# Patient Record
Sex: MALE | Race: WHITE | NOT HISPANIC OR LATINO | URBAN - METROPOLITAN AREA
[De-identification: names, ages, dates, MRNs, and addresses within clinical notes are randomized per-mention and may not be internally consistent; named-entity substitution may affect disease eponyms.]

---

## 2017-01-05 ENCOUNTER — GENERIC CONVERSION - ENCOUNTER (OUTPATIENT)
Dept: OTHER | Facility: OTHER | Age: 57
End: 2017-01-05

## 2017-08-03 ENCOUNTER — ALLSCRIPTS OFFICE VISIT (OUTPATIENT)
Dept: OTHER | Facility: OTHER | Age: 57
End: 2017-08-03

## 2018-01-15 VITALS
HEART RATE: 88 BPM | RESPIRATION RATE: 20 BRPM | DIASTOLIC BLOOD PRESSURE: 80 MMHG | TEMPERATURE: 97 F | BODY MASS INDEX: 28.44 KG/M2 | SYSTOLIC BLOOD PRESSURE: 125 MMHG | OXYGEN SATURATION: 99 % | WEIGHT: 192 LBS | HEIGHT: 69 IN

## 2018-01-16 NOTE — PROGRESS NOTES
Assessment    1  Combined hyperlipidemia (272 2) (E78 2)   2  Encounter for screening colonoscopy (V76 51) (Z12 11)   3  Encounter for preventive health examination (V70 0) (Z00 00)    Plan  Combined hyperlipidemia    · (1) LIPID PANEL, FASTING; Status:Active; Requested [id number];   Encounter for screening colonoscopy    · 1 - Alonso TALBERT, Roseanna Casas (Gastroenterology) Physician Referral  Consult  Status: Active   Requested for: [id number]  Care Summary provided  : Yes  Health Maintenance    · (1) CBC/ PLT (NO DIFF); Status:Active; Requested [id number];    · (1) COMPREHENSIVE METABOLIC PANEL; Status:Active; Requested [id number];     Discussion/Summary  Impression: health maintenance visit  Currently, he eats a healthy diet  Colorectal cancer screening: the risks and benefits of colorectal cancer screening were discussed  Chief Complaint  CPE      History of Present Illness  HM, Adult Male: The patient is being seen for a health maintenance evaluation  The last health maintenance visit was doesn't remember  Social History: Household members include spouse, 1 daughter(s) and 1 son(s)  He is   Work status: working full time  The patient has never smoked cigarettes  He reports rare alcohol use  The patient has no concerns about alcohol abuse  He has never used illicit drugs  General Health: The patient's health since the last visit is described as good  Screening:      Review of Systems    Constitutional: No fever or chills, feels well, no tiredness, no recent weight gain or weight loss  Eyes: No complaints of eye pain, no red eyes, no discharge from eyes, no itchy eyes  ENT: no complaints of earache, no hearing loss, no nosebleeds, no nasal discharge, no sore throat, no hoarseness  Cardiovascular: No complaints of slow heart rate, no fast heart rate, no chest pain, no palpitations, no leg claudication, no lower extremity     Respiratory: No complaints of shortness of breath, no wheezing, no cough, no SOB on exertion, no orthopnea or PND  Musculoskeletal: No complaints of arthralgia, no myalgias, no joint swelling or stiffness, no limb pain or swelling  Integumentary: No complaints of skin rash or skin lesions, no itching, no skin wound, no dry skin  Neurological: No compliants of headache, no confusion, no convulsions, no numbness or tingling, no dizziness or fainting, no limb weakness, no difficulty walking  Psychiatric: Is not suicidal, no sleep disturbances, no anxiety or depression, no change in personality, no emotional problems  Endocrine: No complaints of proptosis, no hot flashes, no muscle weakness, no erectile dysfunction, no deepening of the voice, no feelings of weakness  Hematologic/Lymphatic: No complaints of swollen glands, no swollen glands in the neck, does not bleed easily, no easy bruising  Active Problems    1  Combined hyperlipidemia (272 2) (E78 2)   2  Encounter for screening colonoscopy (V76 51) (Z12 11)    Past Medical History    · History of hyperlipidemia (V12 29) (Z86 39)    Family History  Mother    · Family history of diabetes mellitus (V18 0) (Z83 3)   · Family history of hypertension (V17 49) (Z82 49)  Father    · Family history of diabetes mellitus (V18 0) (Z83 3)   · Family history of hypertension (V17 49) (Z82 49)    Social History    · Never a smoker    Current Meds   1  No Reported Medications Recorded    Allergies    1  No Known Drug Allergies    2  Seasonal    Vitals   Recorded: [id number] 05:28PM   Heart Rate 74    Respiration 20    Blood Pressure 80 mm Hg 140 mm Hg   Height 5 ft 9 in    Weight 191 lb     BMI Calculated 28 21 kg/m2    BSA Calculated 2 03 m2    O2 Saturation 99    Pain Scale 0      Physical Exam    Constitutional   General appearance: No acute distress, well appearing and well nourished  Head and Face   Head and face: Normal     Palpation of the face and sinuses: No sinus tenderness      Eyes   Conjunctiva and lids: No erythema, swelling or discharge  Pupils and irises: Equal, round, reactive to light  Ears, Nose, Mouth, and Throat   Otoscopic examination: Tympanic membranes translucent with normal light reflex  Canals patent without erythema  Hearing: Normal     Nasal mucosa, septum, and turbinates: Normal without edema or erythema  Lips, teeth, and gums: Normal, good dentition  Oropharynx: Normal with no erythema, edema, exudate or lesions  Neck   Neck: Supple, symmetric, trachea midline, no masses  Thyroid: Normal, no thyromegaly  Pulmonary   Respiratory effort: No increased work of breathing or signs of respiratory distress  Auscultation of lungs: Clear to auscultation  Cardiovascular   Auscultation of heart: Normal rate and rhythm, normal S1 and S2, no murmurs  Abdomen   Abdomen: Non-tender, no masses  Liver and spleen: No hepatomegaly or splenomegaly  Examination for hernias: No hernias appreciated  Anus, perineum, and rectum: Normal sphincter tone, no masses, no prolapse  Stool sample for occult blood: Negative  Genitourinary   Scrotal contents: Normal testes, no masses  Penis: Normal, no lesions  Digital rectal exam of prostate: Normal size, no masses  Lymphatic   Palpation of lymph nodes in neck: No lymphadenopathy  Signatures   Electronically signed by :  GEORGE Leslie ; Jul 11 2016 11:48AM EST                       (Author)

## 2019-02-28 ENCOUNTER — OFFICE VISIT (OUTPATIENT)
Dept: FAMILY MEDICINE CLINIC | Facility: CLINIC | Age: 59
End: 2019-02-28
Payer: COMMERCIAL

## 2019-02-28 VITALS
OXYGEN SATURATION: 98 % | RESPIRATION RATE: 16 BRPM | HEART RATE: 80 BPM | SYSTOLIC BLOOD PRESSURE: 168 MMHG | WEIGHT: 210 LBS | DIASTOLIC BLOOD PRESSURE: 84 MMHG | BODY MASS INDEX: 31.01 KG/M2

## 2019-02-28 DIAGNOSIS — N39.43 DRIBBLING OF URINE: ICD-10-CM

## 2019-02-28 DIAGNOSIS — R03.0 ELEVATED BP WITHOUT DIAGNOSIS OF HYPERTENSION: Primary | ICD-10-CM

## 2019-02-28 DIAGNOSIS — M10.9 ACUTE GOUT, UNSPECIFIED CAUSE, UNSPECIFIED SITE: ICD-10-CM

## 2019-02-28 DIAGNOSIS — Z23 ENCOUNTER FOR IMMUNIZATION: ICD-10-CM

## 2019-02-28 PROCEDURE — 99396 PREV VISIT EST AGE 40-64: CPT | Performed by: FAMILY MEDICINE

## 2019-02-28 PROCEDURE — 90471 IMMUNIZATION ADMIN: CPT | Performed by: FAMILY MEDICINE

## 2019-02-28 PROCEDURE — 90682 RIV4 VACC RECOMBINANT DNA IM: CPT | Performed by: FAMILY MEDICINE

## 2019-02-28 RX ORDER — OMEGA-3 FATTY ACIDS/FISH OIL 300-1000MG
600 CAPSULE ORAL 2 TIMES DAILY
COMMUNITY

## 2019-03-04 ENCOUNTER — TELEPHONE (OUTPATIENT)
Dept: FAMILY MEDICINE CLINIC | Facility: CLINIC | Age: 59
End: 2019-03-04

## 2019-03-04 DIAGNOSIS — M10.479 OTHER SECONDARY ACUTE GOUT OF ANKLE, UNSPECIFIED LATERALITY: Primary | ICD-10-CM

## 2019-03-04 RX ORDER — COLCHICINE 0.6 MG/1
TABLET ORAL
Qty: 30 TABLET | Refills: 5 | Status: SHIPPED | OUTPATIENT
Start: 2019-03-04 | End: 2020-08-20 | Stop reason: SDUPTHER

## 2019-03-05 NOTE — PATIENT INSTRUCTIONS
Of no blood pressure is high today he will come back in 2 weeks after his labs are done we will repeat his pressure then

## 2019-03-05 NOTE — PROGRESS NOTES
Assessment/Plan:    No problem-specific Assessment & Plan notes found for this encounter  Diagnoses and all orders for this visit:    Elevated BP without diagnosis of hypertension  -     Comprehensive metabolic panel; Future  -     Lipid panel; Future    Dribbling of urine  -     PSA Total, Diagnostic; Future    Encounter for immunization  -     PREFERRED: influenza vaccine, [id number], quadrivalent, recombinant, PF, 0 5 mL, for patients 18 yr+ (FLUBLOK)    Acute gout, unspecified cause, unspecified site    Other orders  -     Ibuprofen 200 MG CAPS; Take 600 mg by mouth 2 (two) times a day          Subjective:      Patient ID: Tara Ruiz is a 60 y o  male  This is a 50-year-old male who is here for his physical he has a long chronic history of back issues and 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  He has learned to deal with the pain he also had a bout of gout last year but no incidences since he has cut out beer which she has noticed has made a big improvement      The following portions of the patient's history were reviewed and updated as appropriate: current medications, past family history, past medical history, past social history, past surgical history and problem list     Review of Systems   Constitutional: Negative  HENT: Negative  Eyes: Negative  Respiratory: Negative  Cardiovascular: Negative  Gastrointestinal: Negative  Endocrine: Negative  Genitourinary: Negative  Musculoskeletal: Negative  Allergic/Immunologic: Negative  Neurological: Negative  Hematological: Negative  Psychiatric/Behavioral: Negative  Objective:      /84   Pulse 80   Resp 16   Wt 95 3 kg (210 lb)   SpO2 98%   BMI 31 01 kg/m²          Physical Exam   Constitutional: He is oriented to person, place, and time  He appears well-developed and well-nourished  HENT:   Head: Normocephalic and atraumatic     Right Ear: External ear normal    Left Ear: External ear normal    Mouth/Throat: Oropharynx is clear and moist    Eyes: Pupils are equal, round, and reactive to light  Conjunctivae and EOM are normal    Neck: Normal range of motion  Neck supple  Cardiovascular: Normal rate, regular rhythm and normal heart sounds  Pulmonary/Chest: Effort normal and breath sounds normal    Abdominal: Soft  Bowel sounds are normal    Genitourinary: Penis normal    Genitourinary Comments: Testicles no masses   Neurological: He is alert and oriented to person, place, and time  He displays normal reflexes  No cranial nerve deficit  Coordination normal    Skin: Skin is warm and dry  Psychiatric: He has a normal mood and affect   His behavior is normal  Judgment and thought content normal

## 2019-03-12 LAB
ALBUMIN SERPL-MCNC: 4.7 G/DL (ref 3.6–5.1)
ALBUMIN/GLOB SERPL: 1.7 (CALC) (ref 1–2.5)
ALP SERPL-CCNC: 84 U/L (ref 40–115)
ALT SERPL-CCNC: 26 U/L (ref 9–46)
AST SERPL-CCNC: 25 U/L (ref 10–35)
BILIRUB SERPL-MCNC: 0.6 MG/DL (ref 0.2–1.2)
BUN SERPL-MCNC: 12 MG/DL (ref 7–25)
BUN/CREAT SERPL: ABNORMAL (CALC) (ref 6–22)
CALCIUM SERPL-MCNC: 9.7 MG/DL (ref 8.6–10.3)
CHLORIDE SERPL-SCNC: 99 MMOL/L (ref 98–110)
CHOLEST SERPL-MCNC: 212 MG/DL
CHOLEST/HDLC SERPL: 5 (CALC)
CO2 SERPL-SCNC: 29 MMOL/L (ref 20–32)
CREAT SERPL-MCNC: 0.92 MG/DL (ref 0.7–1.33)
GLOBULIN SER CALC-MCNC: 2.8 G/DL (CALC) (ref 1.9–3.7)
GLUCOSE SERPL-MCNC: 158 MG/DL (ref 65–99)
HDLC SERPL-MCNC: 42 MG/DL
LDLC SERPL CALC-MCNC: 142 MG/DL (CALC)
NONHDLC SERPL-MCNC: 170 MG/DL (CALC)
POTASSIUM SERPL-SCNC: 4.1 MMOL/L (ref 3.5–5.3)
PROT SERPL-MCNC: 7.5 G/DL (ref 6.1–8.1)
PSA SERPL-MCNC: 0.4 NG/ML
SL AMB EGFR AFRICAN AMERICAN: 105 ML/MIN/1.73M2
SL AMB EGFR NON AFRICAN AMERICAN: 91 ML/MIN/1.73M2
SODIUM SERPL-SCNC: 136 MMOL/L (ref 135–146)
TRIGL SERPL-MCNC: 151 MG/DL

## 2019-04-25 ENCOUNTER — OFFICE VISIT (OUTPATIENT)
Dept: FAMILY MEDICINE CLINIC | Facility: CLINIC | Age: 59
End: 2019-04-25
Payer: COMMERCIAL

## 2019-04-25 VITALS
TEMPERATURE: 97.6 F | OXYGEN SATURATION: 98 % | SYSTOLIC BLOOD PRESSURE: 150 MMHG | WEIGHT: 212 LBS | DIASTOLIC BLOOD PRESSURE: 88 MMHG | RESPIRATION RATE: 18 BRPM | HEART RATE: 83 BPM | BODY MASS INDEX: 31.31 KG/M2

## 2019-04-25 DIAGNOSIS — R07.9 CHEST PAIN, UNSPECIFIED TYPE: ICD-10-CM

## 2019-04-25 DIAGNOSIS — R19.5 POSITIVE COLORECTAL CANCER SCREENING USING COLOGUARD TEST: Primary | ICD-10-CM

## 2019-04-25 DIAGNOSIS — I10 ESSENTIAL HYPERTENSION: ICD-10-CM

## 2019-04-25 DIAGNOSIS — E78.2 MIXED HYPERLIPIDEMIA: ICD-10-CM

## 2019-04-25 PROCEDURE — 93000 ELECTROCARDIOGRAM COMPLETE: CPT | Performed by: FAMILY MEDICINE

## 2019-04-25 PROCEDURE — 99213 OFFICE O/P EST LOW 20 MIN: CPT | Performed by: FAMILY MEDICINE

## 2019-04-25 RX ORDER — LISINOPRIL 10 MG/1
10 TABLET ORAL DAILY
Qty: 60 TABLET | Refills: 0 | Status: SHIPPED | OUTPATIENT
Start: 2019-04-25 | End: 2019-06-24 | Stop reason: SDUPTHER

## 2019-04-25 RX ORDER — ATORVASTATIN CALCIUM 20 MG/1
20 TABLET, FILM COATED ORAL DAILY
Qty: 60 TABLET | Refills: 0 | Status: SHIPPED | OUTPATIENT
Start: 2019-04-25 | End: 2019-05-23 | Stop reason: SDUPTHER

## 2019-04-26 ENCOUNTER — TELEPHONE (OUTPATIENT)
Dept: FAMILY MEDICINE CLINIC | Facility: CLINIC | Age: 59
End: 2019-04-26

## 2019-05-02 NOTE — PROGRESS NOTES
Assessment/Plan:    No problem-specific Assessment & Plan notes found for this encounter  Diagnoses and all orders for this visit:    Positive colorectal cancer screening using Cologuard test  -     Ambulatory referral to Gastroenterology; Future    Essential hypertension  -     lisinopril (ZESTRIL) 10 mg tablet; Take 1 tablet (10 mg total) by mouth daily  -     atorvastatin (LIPITOR) 20 mg tablet; Take 1 tablet (20 mg total) by mouth daily    Chest pain, unspecified type  -     POCT ECG    Mixed hyperlipidemia          Subjective:      Patient ID: Benita Conley is a 60 y o  male  Patient is here to review his labs PSA was fine his cholesterol levels are up so I started him on a low-dose statin at this point he also had positive cold guard so he was referred to GI for colonoscopy   His blood pressure was mildly high again today so I formally diagnosed with hypertension as well  He occasionally gets some chest heaviness not a particular pattern but he has gained back 20 lb so we will check an EKG here to make sure it looks normal    The following portions of the patient's history were reviewed and updated as appropriate: current medications, past family history, past medical history, past social history, past surgical history and problem list     Review of Systems   HENT: Negative  Eyes: Negative  Respiratory: Negative  Cardiovascular: Negative  Objective:      /88 (BP Location: Left arm, Patient Position: Sitting, Cuff Size: Large)   Pulse 83   Temp 97 6 °F (36 4 °C) (Tympanic)   Resp 18   Wt 96 2 kg (212 lb)   SpO2 98%   BMI 31 31 kg/m²          Physical Exam  Assessment/Plan:    No problem-specific Assessment & Plan notes found for this encounter  Diagnoses and all orders for this visit:    Positive colorectal cancer screening using Cologuard test  -     Ambulatory referral to Gastroenterology;  Future    Essential hypertension  -     lisinopril (ZESTRIL) 10 mg tablet; Take 1 tablet (10 mg total) by mouth daily  -     atorvastatin (LIPITOR) 20 mg tablet; Take 1 tablet (20 mg total) by mouth daily    Chest pain, unspecified type  -     POCT ECG    Mixed hyperlipidemia          Subjective:      Patient ID: Kang Anguiano is a 60 y o  male      HPI    The following portions of the patient's history were reviewed and updated as appropriate: current medications, past family history, past medical history, past social history, past surgical history and problem list     Review of Systems      Objective:      /88 (BP Location: Left arm, Patient Position: Sitting, Cuff Size: Large)   Pulse 83   Temp 97 6 °F (36 4 °C) (Tympanic)   Resp 18   Wt 96 2 kg (212 lb)   SpO2 98%   BMI 31 31 kg/m²          Physical Exam    Physical exam was not done because he just had it 2 weeks ago a complete physical exam today was to review labs and talk about medications to be placed on    EKG was within normal limits  Was told that continues or worsens so let us now

## 2019-05-15 ENCOUNTER — OFFICE VISIT (OUTPATIENT)
Dept: GASTROENTEROLOGY | Facility: CLINIC | Age: 59
End: 2019-05-15
Payer: COMMERCIAL

## 2019-05-15 VITALS
SYSTOLIC BLOOD PRESSURE: 144 MMHG | WEIGHT: 203.6 LBS | HEART RATE: 100 BPM | DIASTOLIC BLOOD PRESSURE: 80 MMHG | TEMPERATURE: 99.1 F | RESPIRATION RATE: 18 BRPM | BODY MASS INDEX: 30.16 KG/M2 | HEIGHT: 69 IN

## 2019-05-15 DIAGNOSIS — R19.5 POSITIVE COLORECTAL CANCER SCREENING USING COLOGUARD TEST: Primary | ICD-10-CM

## 2019-05-15 PROCEDURE — 99204 OFFICE O/P NEW MOD 45 MIN: CPT | Performed by: INTERNAL MEDICINE

## 2019-05-20 NOTE — PROGRESS NOTES
Assessment/Plan:    No problem-specific Assessment & Plan notes found for this encounter  {Assess/PlanSmartLinks:94101}      Subjective:      Patient ID: Bianca Miramontes is a 60 y o  male      HPI    {Common ambulatory SmartLinks:12803}    Review of Systems      Objective:      /88 (BP Location: Left arm, Patient Position: Sitting, Cuff Size: Large)   Pulse 83   Temp 97 6 °F (36 4 °C) (Tympanic)   Resp 18   Wt 96 2 kg (212 lb)   SpO2 98%   BMI 31 31 kg/m²          Physical Exam

## 2019-05-23 DIAGNOSIS — I10 ESSENTIAL HYPERTENSION: ICD-10-CM

## 2019-05-23 RX ORDER — ATORVASTATIN CALCIUM 20 MG/1
20 TABLET, FILM COATED ORAL DAILY
Qty: 90 TABLET | Refills: 1 | Status: SHIPPED | OUTPATIENT
Start: 2019-05-23 | End: 2020-05-12

## 2019-05-28 ENCOUNTER — TELEPHONE (OUTPATIENT)
Dept: GASTROENTEROLOGY | Facility: AMBULARY SURGERY CENTER | Age: 59
End: 2019-05-28

## 2019-05-28 DIAGNOSIS — R19.5 POSITIVE COLORECTAL CANCER SCREENING USING COLOGUARD TEST: Primary | ICD-10-CM

## 2019-05-29 ENCOUNTER — TELEPHONE (OUTPATIENT)
Dept: GASTROENTEROLOGY | Facility: AMBULARY SURGERY CENTER | Age: 59
End: 2019-05-29

## 2019-05-29 LAB
BASOPHILS # BLD AUTO: 20 CELLS/UL (ref 0–200)
BASOPHILS NFR BLD AUTO: 0.4 %
EOSINOPHIL # BLD AUTO: 10 CELLS/UL (ref 15–500)
EOSINOPHIL NFR BLD AUTO: 0.2 %
ERYTHROCYTE [DISTWIDTH] IN BLOOD BY AUTOMATED COUNT: 12.8 % (ref 11–15)
HCT VFR BLD AUTO: 39 % (ref 38.5–50)
HGB BLD-MCNC: 13.9 G/DL (ref 13.2–17.1)
LYMPHOCYTES # BLD AUTO: 1765 CELLS/UL (ref 850–3900)
LYMPHOCYTES NFR BLD AUTO: 34.6 %
MCH RBC QN AUTO: 32.5 PG (ref 27–33)
MCHC RBC AUTO-ENTMCNC: 35.6 G/DL (ref 32–36)
MCV RBC AUTO: 91.1 FL (ref 80–100)
MONOCYTES # BLD AUTO: 428 CELLS/UL (ref 200–950)
MONOCYTES NFR BLD AUTO: 8.4 %
NEUTROPHILS # BLD AUTO: 2876 CELLS/UL (ref 1500–7800)
NEUTROPHILS NFR BLD AUTO: 56.4 %
PLATELET # BLD AUTO: 207 THOUSAND/UL (ref 140–400)
PMV BLD REES-ECKER: 10.2 FL (ref 7.5–12.5)
RBC # BLD AUTO: 4.28 MILLION/UL (ref 4.2–5.8)
TSH SERPL-ACNC: 0.69 MIU/L (ref 0.4–4.5)
WBC # BLD AUTO: 5.1 THOUSAND/UL (ref 3.8–10.8)

## 2019-06-10 ENCOUNTER — ANESTHESIA EVENT (OUTPATIENT)
Dept: GASTROENTEROLOGY | Facility: AMBULARY SURGERY CENTER | Age: 59
End: 2019-06-10

## 2019-06-11 ENCOUNTER — HOSPITAL ENCOUNTER (OUTPATIENT)
Dept: GASTROENTEROLOGY | Facility: AMBULARY SURGERY CENTER | Age: 59
Setting detail: OUTPATIENT SURGERY
Discharge: HOME | End: 2019-06-11
Attending: INTERNAL MEDICINE | Admitting: INTERNAL MEDICINE
Payer: COMMERCIAL

## 2019-06-11 ENCOUNTER — ANESTHESIA (OUTPATIENT)
Dept: GASTROENTEROLOGY | Facility: AMBULARY SURGERY CENTER | Age: 59
End: 2019-06-11

## 2019-06-11 VITALS
HEART RATE: 76 BPM | RESPIRATION RATE: 18 BRPM | TEMPERATURE: 97.1 F | SYSTOLIC BLOOD PRESSURE: 150 MMHG | OXYGEN SATURATION: 100 % | DIASTOLIC BLOOD PRESSURE: 98 MMHG

## 2019-06-11 DIAGNOSIS — R19.5 POSITIVE COLORECTAL CANCER SCREENING USING COLOGUARD TEST: ICD-10-CM

## 2019-06-11 PROCEDURE — G0121 COLON CA SCRN NOT HI RSK IND: HCPCS | Performed by: INTERNAL MEDICINE

## 2019-06-11 RX ORDER — PROPOFOL 10 MG/ML
INJECTION, EMULSION INTRAVENOUS CONTINUOUS PRN
Status: DISCONTINUED | OUTPATIENT
Start: 2019-06-11 | End: 2019-06-11 | Stop reason: SURG

## 2019-06-11 RX ORDER — SODIUM CHLORIDE, SODIUM LACTATE, POTASSIUM CHLORIDE, CALCIUM CHLORIDE 600; 310; 30; 20 MG/100ML; MG/100ML; MG/100ML; MG/100ML
125 INJECTION, SOLUTION INTRAVENOUS CONTINUOUS
Status: DISCONTINUED | OUTPATIENT
Start: 2019-06-11 | End: 2019-06-15 | Stop reason: HOSPADM

## 2019-06-11 RX ORDER — HYDRALAZINE HYDROCHLORIDE 20 MG/ML
INJECTION INTRAMUSCULAR; INTRAVENOUS AS NEEDED
Status: DISCONTINUED | OUTPATIENT
Start: 2019-06-11 | End: 2019-06-11 | Stop reason: SURG

## 2019-06-11 RX ORDER — ASPIRIN 81 MG/1
81 TABLET ORAL DAILY
COMMUNITY

## 2019-06-11 RX ORDER — PROPOFOL 10 MG/ML
INJECTION, EMULSION INTRAVENOUS AS NEEDED
Status: DISCONTINUED | OUTPATIENT
Start: 2019-06-11 | End: 2019-06-11 | Stop reason: SURG

## 2019-06-11 RX ORDER — SODIUM CHLORIDE, SODIUM LACTATE, POTASSIUM CHLORIDE, CALCIUM CHLORIDE 600; 310; 30; 20 MG/100ML; MG/100ML; MG/100ML; MG/100ML
INJECTION, SOLUTION INTRAVENOUS CONTINUOUS PRN
Status: DISCONTINUED | OUTPATIENT
Start: 2019-06-11 | End: 2019-06-11 | Stop reason: SURG

## 2019-06-11 RX ORDER — LIDOCAINE HYDROCHLORIDE 10 MG/ML
0.5 INJECTION, SOLUTION EPIDURAL; INFILTRATION; INTRACAUDAL; PERINEURAL ONCE AS NEEDED
Status: DISCONTINUED | OUTPATIENT
Start: 2019-06-11 | End: 2019-06-15 | Stop reason: HOSPADM

## 2019-06-11 RX ADMIN — HYDRALAZINE HYDROCHLORIDE 10 MG: 20 INJECTION INTRAMUSCULAR; INTRAVENOUS at 11:48

## 2019-06-11 RX ADMIN — PROPOFOL 120 MCG/KG/MIN: 10 INJECTION, EMULSION INTRAVENOUS at 11:29

## 2019-06-11 RX ADMIN — SODIUM CHLORIDE, SODIUM LACTATE, POTASSIUM CHLORIDE, AND CALCIUM CHLORIDE 125 ML/HR: .6; .31; .03; .02 INJECTION, SOLUTION INTRAVENOUS at 10:58

## 2019-06-11 RX ADMIN — SODIUM CHLORIDE, SODIUM LACTATE, POTASSIUM CHLORIDE, AND CALCIUM CHLORIDE: .6; .31; .03; .02 INJECTION, SOLUTION INTRAVENOUS at 11:08

## 2019-06-11 RX ADMIN — PROPOFOL 130 MG: 10 INJECTION, EMULSION INTRAVENOUS at 11:26

## 2019-06-24 DIAGNOSIS — I10 ESSENTIAL HYPERTENSION: ICD-10-CM

## 2019-06-24 RX ORDER — LISINOPRIL 10 MG/1
TABLET ORAL
Qty: 60 TABLET | Refills: 0 | Status: SHIPPED | OUTPATIENT
Start: 2019-06-24 | End: 2019-08-22 | Stop reason: SDUPTHER

## 2019-06-27 DIAGNOSIS — I10 ESSENTIAL HYPERTENSION: ICD-10-CM

## 2019-06-27 RX ORDER — LISINOPRIL 10 MG/1
10 TABLET ORAL DAILY
Qty: 60 TABLET | Refills: 0 | Status: CANCELLED | OUTPATIENT
Start: 2019-06-27

## 2019-08-22 DIAGNOSIS — I10 ESSENTIAL HYPERTENSION: ICD-10-CM

## 2019-08-22 RX ORDER — LISINOPRIL 10 MG/1
TABLET ORAL
Qty: 30 TABLET | Refills: 1 | Status: SHIPPED | OUTPATIENT
Start: 2019-08-22 | End: 2019-09-28 | Stop reason: SDUPTHER

## 2019-08-29 DIAGNOSIS — I10 ESSENTIAL HYPERTENSION: ICD-10-CM

## 2019-08-29 RX ORDER — ATORVASTATIN CALCIUM 20 MG/1
20 TABLET, FILM COATED ORAL DAILY
Qty: 90 TABLET | Refills: 1 | Status: CANCELLED | OUTPATIENT
Start: 2019-08-29

## 2019-09-13 ENCOUNTER — OFFICE VISIT (OUTPATIENT)
Dept: FAMILY MEDICINE CLINIC | Facility: CLINIC | Age: 59
End: 2019-09-13
Payer: COMMERCIAL

## 2019-09-13 VITALS
SYSTOLIC BLOOD PRESSURE: 130 MMHG | DIASTOLIC BLOOD PRESSURE: 86 MMHG | BODY MASS INDEX: 31.1 KG/M2 | HEIGHT: 69 IN | OXYGEN SATURATION: 99 % | WEIGHT: 210 LBS | HEART RATE: 76 BPM

## 2019-09-13 DIAGNOSIS — I10 ESSENTIAL HYPERTENSION: Primary | ICD-10-CM

## 2019-09-13 DIAGNOSIS — E78.2 MIXED HYPERLIPIDEMIA: ICD-10-CM

## 2019-09-13 DIAGNOSIS — Z23 INFLUENZA VACCINATION ADMINISTERED AT CURRENT VISIT: ICD-10-CM

## 2019-09-13 PROCEDURE — 3008F BODY MASS INDEX DOCD: CPT | Performed by: FAMILY MEDICINE

## 2019-09-13 PROCEDURE — 3075F SYST BP GE 130 - 139MM HG: CPT | Performed by: FAMILY MEDICINE

## 2019-09-13 PROCEDURE — 90471 IMMUNIZATION ADMIN: CPT | Performed by: FAMILY MEDICINE

## 2019-09-13 PROCEDURE — 3079F DIAST BP 80-89 MM HG: CPT | Performed by: FAMILY MEDICINE

## 2019-09-13 PROCEDURE — 90682 RIV4 VACC RECOMBINANT DNA IM: CPT | Performed by: FAMILY MEDICINE

## 2019-09-13 PROCEDURE — 99213 OFFICE O/P EST LOW 20 MIN: CPT | Performed by: FAMILY MEDICINE

## 2019-09-16 NOTE — PROGRESS NOTES
Assessment/Plan:    No problem-specific Assessment & Plan notes found for this encounter  Diagnoses and all orders for this visit:    Essential hypertension  -     Lipid Panel with Direct LDL reflex; Future  -     Comprehensive metabolic panel; Future    Influenza vaccination administered at current visit  -     influenza vaccine, [id number], quadrivalent, recombinant, PF, 0 5 mL, for patients 18 yr+ (FLUBLOK)    Mixed hyperlipidemia          Subjective:      Patient ID: Nakia Mueller is a 60 y o  male  Dayves Navaom is here for 3 month check he needs to have his cholesterol repeated has we had started him on any statin he is not having any trouble at this point feeling well his positive cold guard prompted a colonoscopy which was fine    Blood pressure under much better control  The following portions of the patient's history were reviewed and updated as appropriate: current medications, past family history, past medical history, past social history, past surgical history and problem list     Review of Systems   Constitutional: Negative  HENT: Negative  Eyes: Negative  Respiratory: Negative  Cardiovascular: Negative  Objective:      /86   Pulse 76   Ht 5' 9" (1 753 m)   Wt 95 3 kg (210 lb)   SpO2 99%   BMI 31 01 kg/m²          Physical Exam   Constitutional: He appears well-developed and well-nourished  HENT:   Head: Normocephalic and atraumatic  Cardiovascular: Normal rate, regular rhythm and normal heart sounds     Pulmonary/Chest: Effort normal and breath sounds normal

## 2019-09-28 DIAGNOSIS — I10 ESSENTIAL HYPERTENSION: ICD-10-CM

## 2019-10-14 RX ORDER — LISINOPRIL 10 MG/1
TABLET ORAL
Qty: 30 TABLET | Refills: 1 | Status: SHIPPED | OUTPATIENT
Start: 2019-10-14 | End: 2020-10-25 | Stop reason: SDUPTHER

## 2019-11-01 DIAGNOSIS — I10 ESSENTIAL HYPERTENSION: ICD-10-CM

## 2019-11-01 RX ORDER — LISINOPRIL 10 MG/1
TABLET ORAL
Qty: 30 TABLET | Refills: 1 | Status: SHIPPED | OUTPATIENT
Start: 2019-11-01 | End: 2019-12-01 | Stop reason: SDUPTHER

## 2019-11-07 DIAGNOSIS — I10 ESSENTIAL HYPERTENSION: ICD-10-CM

## 2019-11-07 RX ORDER — LISINOPRIL 10 MG/1
10 TABLET ORAL DAILY
Qty: 90 TABLET | Refills: 2 | Status: CANCELLED | OUTPATIENT
Start: 2019-11-07

## 2019-12-01 DIAGNOSIS — I10 ESSENTIAL HYPERTENSION: ICD-10-CM

## 2019-12-27 RX ORDER — LISINOPRIL 10 MG/1
TABLET ORAL
Qty: 30 TABLET | Refills: 1 | Status: SHIPPED | OUTPATIENT
Start: 2019-12-27 | End: 2020-08-20 | Stop reason: SDUPTHER

## 2020-01-03 DIAGNOSIS — I10 ESSENTIAL HYPERTENSION: ICD-10-CM

## 2020-01-03 RX ORDER — LISINOPRIL 10 MG/1
TABLET ORAL
Qty: 90 TABLET | Refills: 3 | Status: SHIPPED | OUTPATIENT
Start: 2020-01-03 | End: 2020-08-20 | Stop reason: SDUPTHER

## 2020-01-08 DIAGNOSIS — I10 ESSENTIAL HYPERTENSION: ICD-10-CM

## 2020-01-08 RX ORDER — LISINOPRIL 10 MG/1
10 TABLET ORAL DAILY
Qty: 30 TABLET | Refills: 1 | Status: CANCELLED | OUTPATIENT
Start: 2020-01-08

## 2020-05-11 DIAGNOSIS — I10 ESSENTIAL HYPERTENSION: ICD-10-CM

## 2020-05-12 RX ORDER — ATORVASTATIN CALCIUM 20 MG/1
TABLET, FILM COATED ORAL
Qty: 90 TABLET | Refills: 1 | Status: SHIPPED | OUTPATIENT
Start: 2020-05-12 | End: 2020-12-14 | Stop reason: SDUPTHER

## 2020-08-20 ENCOUNTER — OFFICE VISIT (OUTPATIENT)
Dept: FAMILY MEDICINE CLINIC | Facility: CLINIC | Age: 60
End: 2020-08-20
Payer: COMMERCIAL

## 2020-08-20 VITALS
OXYGEN SATURATION: 98 % | DIASTOLIC BLOOD PRESSURE: 80 MMHG | HEIGHT: 69 IN | HEART RATE: 98 BPM | WEIGHT: 198.6 LBS | BODY MASS INDEX: 29.41 KG/M2 | TEMPERATURE: 97.6 F | RESPIRATION RATE: 16 BRPM | SYSTOLIC BLOOD PRESSURE: 112 MMHG

## 2020-08-20 DIAGNOSIS — R73.09 ELEVATED GLUCOSE LEVEL: ICD-10-CM

## 2020-08-20 DIAGNOSIS — Z12.5 SCREENING FOR PROSTATE CANCER: ICD-10-CM

## 2020-08-20 DIAGNOSIS — E78.2 MIXED HYPERLIPIDEMIA: ICD-10-CM

## 2020-08-20 DIAGNOSIS — M10.9 GOUT, UNSPECIFIED CAUSE, UNSPECIFIED CHRONICITY, UNSPECIFIED SITE: ICD-10-CM

## 2020-08-20 DIAGNOSIS — Z00.00 ANNUAL PHYSICAL EXAM: Primary | ICD-10-CM

## 2020-08-20 DIAGNOSIS — M10.479 OTHER SECONDARY ACUTE GOUT OF ANKLE, UNSPECIFIED LATERALITY: ICD-10-CM

## 2020-08-20 PROCEDURE — 99396 PREV VISIT EST AGE 40-64: CPT | Performed by: FAMILY MEDICINE

## 2020-08-20 PROCEDURE — 3074F SYST BP LT 130 MM HG: CPT | Performed by: FAMILY MEDICINE

## 2020-08-20 PROCEDURE — 3079F DIAST BP 80-89 MM HG: CPT | Performed by: FAMILY MEDICINE

## 2020-08-20 PROCEDURE — 1036F TOBACCO NON-USER: CPT | Performed by: FAMILY MEDICINE

## 2020-08-20 PROCEDURE — 3008F BODY MASS INDEX DOCD: CPT | Performed by: FAMILY MEDICINE

## 2020-08-20 RX ORDER — COLCHICINE 0.6 MG/1
TABLET ORAL
Qty: 30 TABLET | Refills: 5 | Status: SHIPPED | OUTPATIENT
Start: 2020-08-20 | End: 2021-09-09 | Stop reason: SDUPTHER

## 2020-08-20 NOTE — PROGRESS NOTES
Subjective:           Problem List Items Addressed This Visit        Other    Annual physical exam - Primary    Gout stable cont medications Labs    Mixed hyperlipidemia low fat diet      Other Visit Diagnoses     Elevated glucose level     Low carb    Screening for prostate cancer                  No orders of the defined types were placed in this encounter  Patient Instructions   Stay current with colon, prostate screening Labs Low fat diet    BMI Counseling: Body mass index is 29 67 kg/m²  Discussed the patient's BMI with him  The BMI is above normal  Nutrition recommendations include 3-5 servings of fruits/vegetables daily, consuming healthier snacks and decreasing soda and/or juice intake  Javier Mealing    Chief Complaint   Patient presents with    Physical Exam     physical,doing well,no new food or drug allergies  had back surgery 06/18/2020  annual physical HTN gout back surgery  HPI s/p lumpectomy fusion  L4 2 months ago [place]    /80 (BP Location: Left arm, Patient Position: Sitting, Cuff Size: Standard)   Pulse 98   Temp 97 6 °F (36 4 °C) (Tympanic)   Resp 16   Ht 5' 8 6" (1 742 m)   Wt 90 1 kg (198 lb 9 6 oz)   SpO2 98%   BMI 29 67 kg/m²       Allergies   Allergen Reactions    Other      seasonal       Current Outpatient Medications on File Prior to Visit   Medication Sig Dispense Refill    atorvastatin (LIPITOR) 20 mg tablet TAKE 1 TABLET BY MOUTH DAILY  90 tablet 1    lisinopril (ZESTRIL) 10 mg tablet TAKE 1 TABLET BY MOUTH EVERY DAY 30 tablet 1    aspirin (ECOTRIN LOW STRENGTH) 81 mg EC tablet Take 81 mg by mouth daily      colchicine (COLCRYS) 0 6 mg tablet For acute attack take 1 2 mg at 1st sign of the flare then 0 6 mg 1 hour later not to exceed 1 8 in 1 hour  period (Patient not taking: Reported on 8/20/2020) 30 tablet 5    Ibuprofen 200 MG CAPS Take 600 mg by mouth 2 (two) times a day      Na Sulfate-K Sulfate-Mg Sulf (SUPREP BOWEL PREP KIT) 17 5-3 13-1 6 GM/177ML SOLN Take 2 Bottles by mouth see administration instructions Suprep bowel prep  Please follow the instructions from the office (Patient not taking: Reported on 8/20/2020) 2 Bottle 0    [DISCONTINUED] lisinopril (ZESTRIL) 10 mg tablet TAKE 1 TABLET BY MOUTH EVERY DAY 30 tablet 1    [DISCONTINUED] lisinopril (ZESTRIL) 10 mg tablet TAKE 1 TABLET BY MOUTH EVERY DAY 90 tablet 3     No current facility-administered medications on file prior to visit  Past Medical History:   Diagnosis Date    Hypertension        Past Surgical History:   Procedure Laterality Date    ANKLE FRACTURE SURGERY Right     orif    ANKLE HARDWARE REMOVAL Right     BACK SURGERY      laminectomy l 5    BICEPS TENDON REPAIR Right           reports that he has never smoked  He has never used smokeless tobacco  He reports current alcohol use  He reports that he does not use drugs  reports that he has never smoked  He has never used smokeless tobacco         Review of Systems   Constitutional: Negative for appetite change, diaphoresis and fever  HENT: Negative for congestion, ear pain, postnasal drip, sinus pressure, tinnitus and voice change  Eyes: Negative for discharge and itching  Respiratory: Negative for cough, chest tightness, shortness of breath and stridor  Cardiovascular: Negative for chest pain and palpitations  Gastrointestinal: Negative for abdominal distention, blood in stool, diarrhea and vomiting  Endocrine: Negative for cold intolerance  Genitourinary: Negative for flank pain, genital sores and testicular pain  Musculoskeletal: Negative for arthralgias, joint swelling and myalgias  Skin: Negative for rash  Neurological: Negative for tremors, speech difficulty and headaches  Hematological: Negative for adenopathy  Psychiatric/Behavioral: Negative for behavioral problems, dysphoric mood, hallucinations and suicidal ideas         Physical Exam  Vitals signs and nursing note reviewed  Constitutional:       Appearance: He is well-developed  HENT:      Head: Normocephalic and atraumatic  Right Ear: External ear normal       Left Ear: External ear normal       Mouth/Throat:      Pharynx: No oropharyngeal exudate  Eyes:      General: No scleral icterus  Right eye: No discharge  Left eye: No discharge  Conjunctiva/sclera: Conjunctivae normal       Pupils: Pupils are equal, round, and reactive to light  Neck:      Musculoskeletal: Normal range of motion and neck supple  Trachea: No tracheal deviation  Cardiovascular:      Rate and Rhythm: Normal rate and regular rhythm  Heart sounds: No murmur  No friction rub  Pulmonary:      Effort: Pulmonary effort is normal  No respiratory distress  Breath sounds: No stridor  No wheezing or rales  Abdominal:      General: Bowel sounds are normal  There is no distension  Palpations: Abdomen is soft  Tenderness: There is no guarding or rebound  Musculoskeletal:         General: No deformity  Comments: L shoulder AC crep limited ROM   Lymphadenopathy:      Cervical: No cervical adenopathy  Skin:     General: Skin is warm and dry  Capillary Refill: Capillary refill takes less than 2 seconds  Findings: No rash  Comments: Healed scar low lumbar tattoos    Neurological:      General: No focal deficit present  Mental Status: He is alert and oriented to person, place, and time  Cranial Nerves: No cranial nerve deficit  Motor: No abnormal muscle tone  Coordination: Coordination normal    Psychiatric:         Behavior: Behavior normal          Thought Content:  Thought content normal          Judgment: Judgment normal

## 2020-08-26 LAB
ALBUMIN SERPL-MCNC: 4.4 G/DL (ref 3.6–5.1)
ALBUMIN/GLOB SERPL: 1.7 (CALC) (ref 1–2.5)
ALP SERPL-CCNC: 99 U/L (ref 35–144)
ALT SERPL-CCNC: 20 U/L (ref 9–46)
AST SERPL-CCNC: 15 U/L (ref 10–35)
BILIRUB SERPL-MCNC: 0.7 MG/DL (ref 0.2–1.2)
BUN SERPL-MCNC: 11 MG/DL (ref 7–25)
BUN/CREAT SERPL: ABNORMAL (CALC) (ref 6–22)
CALCIUM SERPL-MCNC: 9.2 MG/DL (ref 8.6–10.3)
CHLORIDE SERPL-SCNC: 98 MMOL/L (ref 98–110)
CHOLEST SERPL-MCNC: 158 MG/DL
CHOLEST/HDLC SERPL: 4.3 (CALC)
CO2 SERPL-SCNC: 25 MMOL/L (ref 20–32)
CREAT SERPL-MCNC: 0.95 MG/DL (ref 0.7–1.25)
GLOBULIN SER CALC-MCNC: 2.6 G/DL (CALC) (ref 1.9–3.7)
GLUCOSE SERPL-MCNC: 264 MG/DL (ref 65–99)
HBA1C MFR BLD: 10.4 % OF TOTAL HGB
HDLC SERPL-MCNC: 37 MG/DL
LDLC SERPL CALC-MCNC: 89 MG/DL (CALC)
NONHDLC SERPL-MCNC: 121 MG/DL (CALC)
POTASSIUM SERPL-SCNC: 4.1 MMOL/L (ref 3.5–5.3)
PROT SERPL-MCNC: 7 G/DL (ref 6.1–8.1)
PSA SERPL-MCNC: 0.3 NG/ML
SL AMB EGFR AFRICAN AMERICAN: 100 ML/MIN/1.73M2
SL AMB EGFR NON AFRICAN AMERICAN: 87 ML/MIN/1.73M2
SODIUM SERPL-SCNC: 133 MMOL/L (ref 135–146)
TRIGL SERPL-MCNC: 233 MG/DL

## 2020-09-01 ENCOUNTER — TELEPHONE (OUTPATIENT)
Dept: FAMILY MEDICINE CLINIC | Facility: CLINIC | Age: 60
End: 2020-09-01

## 2020-09-08 ENCOUNTER — OFFICE VISIT (OUTPATIENT)
Dept: FAMILY MEDICINE CLINIC | Facility: CLINIC | Age: 60
End: 2020-09-08
Payer: COMMERCIAL

## 2020-09-08 VITALS
SYSTOLIC BLOOD PRESSURE: 116 MMHG | WEIGHT: 200.9 LBS | DIASTOLIC BLOOD PRESSURE: 76 MMHG | HEIGHT: 68 IN | OXYGEN SATURATION: 98 % | TEMPERATURE: 96.9 F | HEART RATE: 92 BPM | BODY MASS INDEX: 30.45 KG/M2

## 2020-09-08 DIAGNOSIS — E11.65 TYPE 2 DIABETES MELLITUS WITH HYPERGLYCEMIA, WITHOUT LONG-TERM CURRENT USE OF INSULIN (HCC): Primary | ICD-10-CM

## 2020-09-08 PROCEDURE — 99213 OFFICE O/P EST LOW 20 MIN: CPT | Performed by: FAMILY MEDICINE

## 2020-09-08 RX ORDER — GLYBURIDE 5 MG/1
5 TABLET ORAL 2 TIMES DAILY WITH MEALS
Qty: 180 TABLET | Refills: 1 | Status: SHIPPED | OUTPATIENT
Start: 2020-09-08 | End: 2020-09-25 | Stop reason: ALTCHOICE

## 2020-09-08 NOTE — PROGRESS NOTES
Assessment/Plan:      Diagnoses and all orders for this visit:    Type 2 diabetes mellitus with hyperglycemia, without long-term current use of insulin (HCC)  -     glyBURIDE (DIABETA) 5 mg tablet; Take 1 tablet (5 mg total) by mouth 2 (two) times a day with meals  -     metFORMIN (GLUCOPHAGE) 1000 MG tablet; Take 1 tablet (1,000 mg total) by mouth 2 (two) times a day with meals  -     Empagliflozin 10 MG TABS; Take 1 tablet (10 mg total) by mouth every morning        Patient diagnosed with Type 2 DM based on A1c levels  Patient counseled on importance of making dietary changes and addition of exercise  Patient reports returning to work soon and hopes that will allow him to fix his diet and allow for more physical activity  Will consider adding medication if triglycerides remain elevated on next blood work  Follow up in 1 month  Subjective:  Patient is here to discuss bloodwork drawn on August 25th, 2020  Hgb A1c 10 4  Triglycerides 233  Patient's last physical exam was on August 20th, 2020  Patient ID: Nani Mace is a 60 y o  male  Review of Systems   Constitutional: Negative for chills, fatigue and fever  HENT: Negative for congestion, ear pain, hearing loss, rhinorrhea, sore throat, tinnitus and trouble swallowing  Eyes: Negative for pain and visual disturbance  Respiratory: Negative for cough and shortness of breath  Cardiovascular: Negative for chest pain and palpitations  Gastrointestinal: Negative for abdominal pain, blood in stool, constipation, diarrhea, nausea and vomiting  Endocrine: Negative for cold intolerance and heat intolerance  Genitourinary: Negative for difficulty urinating, dysuria and hematuria  Musculoskeletal: Negative for arthralgias, back pain and myalgias  Skin: Negative for rash  Neurological: Negative for dizziness, weakness, light-headedness and headaches  Hematological: Does not bruise/bleed easily     Psychiatric/Behavioral: Negative for confusion  The patient is not nervous/anxious  Objective:     Physical Exam  Constitutional:       Appearance: Normal appearance  HENT:      Head: Normocephalic and atraumatic  Mouth/Throat:      Mouth: Mucous membranes are moist       Pharynx: Oropharynx is clear  Eyes:      Conjunctiva/sclera: Conjunctivae normal       Pupils: Pupils are equal, round, and reactive to light  Cardiovascular:      Rate and Rhythm: Normal rate and regular rhythm  Pulses: Normal pulses  Heart sounds: Normal heart sounds  Pulmonary:      Effort: Pulmonary effort is normal       Breath sounds: Normal breath sounds  Abdominal:      General: Abdomen is flat  Bowel sounds are normal       Tenderness: There is no abdominal tenderness  Musculoskeletal:      Right lower leg: No edema  Left lower leg: No edema  Skin:     General: Skin is warm and dry  Neurological:      General: No focal deficit present  Mental Status: He is alert and oriented to person, place, and time  Psychiatric:         Mood and Affect: Mood normal          Thought Content:  Thought content normal

## 2020-09-25 ENCOUNTER — TELEMEDICINE (OUTPATIENT)
Dept: FAMILY MEDICINE CLINIC | Facility: CLINIC | Age: 60
End: 2020-09-25
Payer: COMMERCIAL

## 2020-09-25 DIAGNOSIS — E11.65 TYPE 2 DIABETES MELLITUS WITH HYPERGLYCEMIA, WITHOUT LONG-TERM CURRENT USE OF INSULIN (HCC): Primary | ICD-10-CM

## 2020-09-25 PROCEDURE — 99213 OFFICE O/P EST LOW 20 MIN: CPT | Performed by: FAMILY MEDICINE

## 2020-09-25 NOTE — PROGRESS NOTES
Virtual Brief Visit    Assessment/Plan:    Type II Diabetes Mellitus with new hypoglycemic events since starting medication  Rolf Sanchez is a pleasant 60year old male with past medical history of essential hypertension and hyperlipidemia who reports to me that he was diagnosed with and started on treatment for Type II diabetes at the end of August  Even if he was recently diagnosed with diabetes in the last month he likely has had it for years is my suspicion  Nevertheless at his last office visit he was started on triple oral anti hyperglycemic therapy with an SGLT-2 inhibitor Empagliflozin 10 mg PO qam, biguanides  Metformin 1000 mg PO bid, as well as Sulfonylurea glyburide 5 mg PO bid  Since then he has been complaining of hypoglycemic like symptoms most prevalent in the evening including shaking, irritability, diaphoresis, focus impairment and feelings of light-headedness that require him to drink a glass of orange juice or eat some candy  He reports since starting this regimen and being more aware of his food intake he is feeling these symptoms  He will check his blood sugar around these times and it will be in the 80's  Although ADA guidelines recommend fasting sugars be between  and 2 hour post prandial less than 180 in diabetics I feel that given his recent diagnosis of DM and triple therapy he may be experiencing hypoglycemic events especially since he is on sulfonylurea which is notorious for this   Although triple oral therapy is not inappropriate in a diabetic with an A1c of 10 4 (which was patients reading on 8/25/2020) given that he is making numerous strides in his diet as well as the fact that he may be sensitive to these combinations of medications I feel it is appropriate to discontinue his glyburide at this time continue the other two medications and refer the patient to diabetes education so he better understands his disease as well as proper carbohydrate intake so he better utilizes his bodies natural way of regulating sugar and insulin with the hopes that he can be controlled with less medication overtime  He is to take a fasting sugar in the morning and alternate between afternoon and 2 hour post prandial sugar checks to determine how sugar control is optimized with dual anti oral hyperglycemic regimens  He will follow up in office in October  Reason for visit is   Chief Complaint   Patient presents with    Virtual Brief Visit        Encounter provider Galilea Hatch MD    Provider located at [address] [place] [id number]    Recent Visits  No visits were found meeting these conditions  Showing recent visits within past 7 days and meeting all other requirements     Today's Visits  Date Type Provider Dept   09/25/20 Telemedicine Renetta Rubinstein, MD [facility]   Showing today's visits and meeting all other requirements     Future Appointments  No visits were found meeting these conditions  Showing future appointments within next 150 days and meeting all other requirements        After connecting through telephone, the patient was identified by name and date of birth  Janet Samson was informed that this is a telemedicine visit and that the visit is being conducted through telephone  My office door was closed  No one else was in the room  He acknowledged consent and understanding of privacy and security of the platform  The patient has agreed to participate and understands he can discontinue the visit at any time  Patient is aware this is a billable service  Subjective    Janet Samson is a 60 y o  male        HPI     60year old male with past medical history of essential hypertension and hyperlipidemia and more recent diagnosis of type 2 diabetes via hemoglobin a1c at end of august complains of hypoglycemic symptoms including shaking, irritability, diaphoresis, focus impairment and feelings of light-headedness that require him to drink a glass of orange juice or eat some candy  He feels better shortly thereafter  Had A1c of 10 4 on 8/25/2020  Was placed on triple anti oral hyperglycemic therapy for this (see above)  He's also more cognizant of what he puts in his body and adamant about losing weight as well  Has never been to diabetes education  Past Medical History:   Diagnosis Date    Hypertension        Past Surgical History:   Procedure Laterality Date    ANKLE FRACTURE SURGERY Right     orif    ANKLE HARDWARE REMOVAL Right     BACK SURGERY      laminectomy l 5    BICEPS TENDON REPAIR Right        Current Outpatient Medications   Medication Sig Dispense Refill    aspirin (ECOTRIN LOW STRENGTH) 81 mg EC tablet Take 81 mg by mouth daily      atorvastatin (LIPITOR) 20 mg tablet TAKE 1 TABLET BY MOUTH DAILY  90 tablet 1    colchicine (COLCRYS) 0 6 mg tablet For acute attack take 1 2 mg at 1st sign of the flare then 0 6 mg 1 hour later not to exceed 1 8 in 1 hour  period 30 tablet 5    Empagliflozin 10 MG TABS Take 1 tablet (10 mg total) by mouth every morning 90 tablet 1    Ibuprofen 200 MG CAPS Take 600 mg by mouth 2 (two) times a day      lisinopril (ZESTRIL) 10 mg tablet TAKE 1 TABLET BY MOUTH EVERY DAY 30 tablet 1    metFORMIN (GLUCOPHAGE) 1000 MG tablet Take 1 tablet (1,000 mg total) by mouth 2 (two) times a day with meals 180 tablet 1    Na Sulfate-K Sulfate-Mg Sulf (SUPREP BOWEL PREP KIT) 17 5-3 13-1 6 GM/177ML SOLN Take 2 Bottles by mouth see administration instructions Suprep bowel prep  Please follow the instructions from the office (Patient not taking: Reported on 9/8/2020) 2 Bottle 0     No current facility-administered medications for this visit  Allergies   Allergen Reactions    Other      seasonal       Review of Systems Per HPI    There were no vitals filed for this visit        I spent 25 minutes directly with the patient during this visit    VIRTUAL VISIT DISCLAIMER    Monisha Schaffer acknowledges that he has consented to an online visit or consultation  He understands that the online visit is based solely on information provided by him, and that, in the absence of a face-to-face physical evaluation by the physician, the diagnosis he receives is both limited and provisional in terms of accuracy and completeness  This is not intended to replace a full medical face-to-face evaluation by the physician  Corneliusarnold Sarbjit understands and accepts these terms

## 2020-10-08 ENCOUNTER — OFFICE VISIT (OUTPATIENT)
Dept: FAMILY MEDICINE CLINIC | Facility: CLINIC | Age: 60
End: 2020-10-08
Payer: COMMERCIAL

## 2020-10-08 VITALS
HEIGHT: 68 IN | OXYGEN SATURATION: 98 % | RESPIRATION RATE: 18 BRPM | WEIGHT: 201.1 LBS | SYSTOLIC BLOOD PRESSURE: 112 MMHG | TEMPERATURE: 97.7 F | DIASTOLIC BLOOD PRESSURE: 70 MMHG | HEART RATE: 80 BPM | BODY MASS INDEX: 30.48 KG/M2

## 2020-10-08 DIAGNOSIS — E11.69 TYPE 2 DIABETES MELLITUS WITH OTHER SPECIFIED COMPLICATION, WITHOUT LONG-TERM CURRENT USE OF INSULIN (HCC): Primary | ICD-10-CM

## 2020-10-08 DIAGNOSIS — Z23 ENCOUNTER FOR IMMUNIZATION: ICD-10-CM

## 2020-10-08 PROBLEM — E11.9 DIABETES MELLITUS (HCC): Status: ACTIVE | Noted: 2020-10-08

## 2020-10-08 PROCEDURE — 1036F TOBACCO NON-USER: CPT

## 2020-10-08 PROCEDURE — 90471 IMMUNIZATION ADMIN: CPT

## 2020-10-08 PROCEDURE — 99213 OFFICE O/P EST LOW 20 MIN: CPT

## 2020-10-08 PROCEDURE — 90682 RIV4 VACC RECOMBINANT DNA IM: CPT

## 2020-10-08 PROCEDURE — 3078F DIAST BP <80 MM HG: CPT

## 2020-10-23 DIAGNOSIS — I10 ESSENTIAL HYPERTENSION: ICD-10-CM

## 2020-10-25 PROCEDURE — 4010F ACE/ARB THERAPY RXD/TAKEN: CPT

## 2020-10-25 RX ORDER — LISINOPRIL 10 MG/1
10 TABLET ORAL DAILY
Qty: 90 TABLET | Refills: 2 | Status: SHIPPED | OUTPATIENT
Start: 2020-10-25 | End: 2021-07-14 | Stop reason: SDUPTHER

## 2020-10-31 DIAGNOSIS — I10 ESSENTIAL HYPERTENSION: ICD-10-CM

## 2020-12-14 DIAGNOSIS — I10 ESSENTIAL HYPERTENSION: ICD-10-CM

## 2020-12-14 RX ORDER — ATORVASTATIN CALCIUM 20 MG/1
20 TABLET, FILM COATED ORAL DAILY
Qty: 90 TABLET | Refills: 3 | Status: SHIPPED | OUTPATIENT
Start: 2020-12-14 | End: 2020-12-24 | Stop reason: SDUPTHER

## 2020-12-23 RX ORDER — ATORVASTATIN CALCIUM 20 MG/1
TABLET, FILM COATED ORAL
Qty: 90 TABLET | Refills: 1 | OUTPATIENT
Start: 2020-12-23

## 2020-12-24 DIAGNOSIS — I10 ESSENTIAL HYPERTENSION: ICD-10-CM

## 2020-12-24 RX ORDER — ATORVASTATIN CALCIUM 20 MG/1
20 TABLET, FILM COATED ORAL DAILY
Qty: 90 TABLET | Refills: 3 | Status: SHIPPED | OUTPATIENT
Start: 2020-12-24 | End: 2021-12-27

## 2021-01-11 PROBLEM — E11.69 COMBINED HYPERLIPIDEMIA ASSOCIATED WITH TYPE 2 DIABETES MELLITUS (HCC): Status: ACTIVE | Noted: 2021-01-11

## 2021-01-11 PROBLEM — E78.2 COMBINED HYPERLIPIDEMIA ASSOCIATED WITH TYPE 2 DIABETES MELLITUS (HCC): Status: ACTIVE | Noted: 2021-01-11

## 2021-01-18 ENCOUNTER — OFFICE VISIT (OUTPATIENT)
Dept: FAMILY MEDICINE CLINIC | Facility: CLINIC | Age: 61
End: 2021-01-18
Payer: COMMERCIAL

## 2021-01-18 VITALS
HEART RATE: 100 BPM | HEIGHT: 68 IN | WEIGHT: 192 LBS | TEMPERATURE: 98.7 F | RESPIRATION RATE: 16 BRPM | SYSTOLIC BLOOD PRESSURE: 120 MMHG | DIASTOLIC BLOOD PRESSURE: 80 MMHG | BODY MASS INDEX: 29.1 KG/M2 | OXYGEN SATURATION: 97 %

## 2021-01-18 DIAGNOSIS — E11.65 TYPE 2 DIABETES MELLITUS WITH HYPERGLYCEMIA, WITHOUT LONG-TERM CURRENT USE OF INSULIN (HCC): ICD-10-CM

## 2021-01-18 DIAGNOSIS — E78.2 COMBINED HYPERLIPIDEMIA ASSOCIATED WITH TYPE 2 DIABETES MELLITUS (HCC): ICD-10-CM

## 2021-01-18 DIAGNOSIS — E78.2 MIXED HYPERLIPIDEMIA: ICD-10-CM

## 2021-01-18 DIAGNOSIS — E11.69 TYPE 2 DIABETES MELLITUS WITH OTHER SPECIFIED COMPLICATION, WITHOUT LONG-TERM CURRENT USE OF INSULIN (HCC): Primary | ICD-10-CM

## 2021-01-18 DIAGNOSIS — E11.69 COMBINED HYPERLIPIDEMIA ASSOCIATED WITH TYPE 2 DIABETES MELLITUS (HCC): ICD-10-CM

## 2021-01-18 LAB — SL AMB POCT HEMOGLOBIN AIC: 5.8 (ref ?–6.5)

## 2021-01-18 PROCEDURE — 3079F DIAST BP 80-89 MM HG: CPT | Performed by: FAMILY MEDICINE

## 2021-01-18 PROCEDURE — 3074F SYST BP LT 130 MM HG: CPT | Performed by: FAMILY MEDICINE

## 2021-01-18 PROCEDURE — 3044F HG A1C LEVEL LT 7.0%: CPT | Performed by: FAMILY MEDICINE

## 2021-01-18 PROCEDURE — 99214 OFFICE O/P EST MOD 30 MIN: CPT | Performed by: FAMILY MEDICINE

## 2021-01-18 PROCEDURE — 3008F BODY MASS INDEX DOCD: CPT | Performed by: FAMILY MEDICINE

## 2021-01-18 PROCEDURE — 1036F TOBACCO NON-USER: CPT | Performed by: FAMILY MEDICINE

## 2021-01-18 PROCEDURE — 83036 HEMOGLOBIN GLYCOSYLATED A1C: CPT | Performed by: FAMILY MEDICINE

## 2021-01-18 NOTE — PATIENT INSTRUCTIONS
ADA diet weight loss 10% six-month A1c goal less than 7 5 labs as ordered stay come with routine health maintenance screening

## 2021-01-18 NOTE — PROGRESS NOTES
Subjective:           Problem List Items Addressed This Visit        Endocrine     continue diet med adjustment    Relevant Orders    POCT hemoglobin A1c (Completed)    Hemoglobin A1C    6 0    Combined hyperlipidemia associated with type 2 diabetes mellitus (HCC) low fat diet cont medications    Relevant Orders    Comprehensive metabolic panel    Lipid panel       Other    Mixed hyperlipidemia low fat diet  Relevant Orders    Comprehensive metabolic panel    Lipid panel              Orders Placed This Encounter   Procedures    Comprehensive metabolic panel     This is a patient instruction: Patient fasting for 8 hours or longer recommended  Standing Status:   Future     Standing Expiration Date:   1/18/2022    Lipid panel     This is a patient instruction: This test requires patient fasting for 10-12 hours or longer  Drinking of black coffee or black tea is acceptable  Standing Status:   Future     Standing Expiration Date:   1/18/2022    Hemoglobin A1C     Standing Status:   Future     Standing Expiration Date:   1/18/2022    POCT hemoglobin A1c       Patient Instructions   ADA diet weight loss 10% six-month A1c goal less than 7 5 labs as ordered stay come with routine health maintenance screening    BMI Counseling: Body mass index is 29 19 kg/m²  Discussed the patient's BMI with him  The BMI is above normal  Nutrition recommendations include moderation in carbohydrate intake and increasing intake of lean protein  Yi Bell    Chief Complaint   Patient presents with    Follow-up     f/u visit on DM,no new food or drug allergies  HPI Ekta Samaniego is in for evaluation follow-up diabetes currently on medications has history of gout hyperlipidemia last A1c 10 4  He is on 2 medication therapy pending follow-up labs   [facility]    /80 (BP Location: Left arm, Patient Position: Sitting, Cuff Size: Standard)   Pulse 100   Temp 98 7 °F (37 1 °C) (Tympanic)   Resp 16 Ht 5' 8" (1 727 m)   Wt 87 1 kg (192 lb)   SpO2 97%   BMI 29 19 kg/m²       Allergies   Allergen Reactions    Other      seasonal       Current Outpatient Medications on File Prior to Visit   Medication Sig Dispense Refill    aspirin (ECOTRIN LOW STRENGTH) 81 mg EC tablet Take 81 mg by mouth daily      atorvastatin (LIPITOR) 20 mg tablet Take 1 tablet (20 mg total) by mouth daily 90 tablet 3    Empagliflozin 10 MG TABS Take 1 tablet (10 mg total) by mouth every morning 90 tablet 1    lisinopril (ZESTRIL) 10 mg tablet Take 1 tablet (10 mg total) by mouth daily 90 tablet 2    [DISCONTINUED] metFORMIN (GLUCOPHAGE) 1000 MG tablet Take 1 tablet (1,000 mg total) by mouth 2 (two) times a day with meals 180 tablet 1    colchicine (COLCRYS) 0 6 mg tablet For acute attack take 1 2 mg at 1st sign of the flare then 0 6 mg 1 hour later not to exceed 1 8 in 1 hour  period (Patient not taking: Reported on 10/8/2020) 30 tablet 5    Ibuprofen 200 MG CAPS Take 600 mg by mouth 2 (two) times a day      Na Sulfate-K Sulfate-Mg Sulf (SUPREP BOWEL PREP KIT) 17 5-3 13-1 6 GM/177ML SOLN Take 2 Bottles by mouth see administration instructions Suprep bowel prep  Please follow the instructions from the office (Patient not taking: Reported on 9/8/2020) 2 Bottle 0     No current facility-administered medications on file prior to visit  Past Medical History:   Diagnosis Date    Hypertension        Past Surgical History:   Procedure Laterality Date    ANKLE FRACTURE SURGERY Right     orif    ANKLE HARDWARE REMOVAL Right     BACK SURGERY      laminectomy l 5    BICEPS TENDON REPAIR Right           reports that he has never smoked  He has never used smokeless tobacco  He reports current alcohol use  He reports that he does not use drugs  reports that he has never smoked  He has never used smokeless tobacco         Review of Systems   Constitutional: Negative for appetite change, diaphoresis and fever     HENT: Negative for congestion, ear pain, postnasal drip, sinus pressure, tinnitus and voice change  Eyes: Negative for discharge and itching  Respiratory: Negative for cough, chest tightness, shortness of breath and stridor  Cardiovascular: Negative for chest pain and palpitations  Gastrointestinal: Negative for abdominal distention, blood in stool, diarrhea and vomiting  Endocrine: Negative for cold intolerance  Genitourinary: Negative for flank pain, genital sores and testicular pain  Musculoskeletal: Positive for back pain  Negative for arthralgias, joint swelling and myalgias  Post op   Skin: Negative for rash  Neurological: Negative for tremors, speech difficulty and headaches  Hematological: Negative for adenopathy  Psychiatric/Behavioral: Negative for behavioral problems, dysphoric mood, hallucinations and suicidal ideas  Physical Exam  Vitals signs and nursing note reviewed  Constitutional:       Appearance: He is well-developed  HENT:      Head: Normocephalic and atraumatic  Right Ear: External ear normal       Left Ear: External ear normal       Mouth/Throat:      Pharynx: No oropharyngeal exudate  Eyes:      General: No scleral icterus  Right eye: No discharge  Left eye: No discharge  Conjunctiva/sclera: Conjunctivae normal       Pupils: Pupils are equal, round, and reactive to light  Neck:      Musculoskeletal: Normal range of motion and neck supple  Trachea: No tracheal deviation  Cardiovascular:      Rate and Rhythm: Normal rate and regular rhythm  Pulses: no weak pulses          Dorsalis pedis pulses are 2+ on the right side and 2+ on the left side  Heart sounds: No murmur  No friction rub  Pulmonary:      Effort: Pulmonary effort is normal  No respiratory distress  Breath sounds: No stridor  No wheezing or rales  Abdominal:      General: Bowel sounds are normal  There is no distension  Palpations: Abdomen is soft  Tenderness: There is no guarding or rebound  Musculoskeletal:         General: No deformity  Comments: L shoulder AC crep limited ROM   Feet:      Right foot:      Skin integrity: No ulcer, skin breakdown, erythema, warmth, callus or dry skin  Left foot:      Skin integrity: No ulcer, skin breakdown, erythema, warmth, callus or dry skin  Lymphadenopathy:      Cervical: No cervical adenopathy  Skin:     General: Skin is warm and dry  Capillary Refill: Capillary refill takes less than 2 seconds  Findings: No rash  Comments: Healed scar low lumbar tattoos    Neurological:      General: No focal deficit present  Mental Status: He is alert and oriented to person, place, and time  Mental status is at baseline  Cranial Nerves: No cranial nerve deficit  Motor: No weakness or abnormal muscle tone  Coordination: Coordination normal       Gait: Gait normal    Psychiatric:         Mood and Affect: Mood normal          Behavior: Behavior normal          Thought Content: Thought content normal          Judgment: Judgment normal        Patient's shoes and socks removed  Right Foot/Ankle   Right Foot Inspection  Skin Exam: skin normal and skin intact no dry skin, no warmth, no callus, no erythema, no maceration, no abnormal color, no pre-ulcer, no ulcer and no callus                          Toe Exam: ROM and strength within normal limits  Sensory       Monofilament testing: diminished  Vascular  Capillary refills: < 3 seconds  The right DP pulse is 2+  Left Foot/Ankle  Left Foot Inspection  Skin Exam: skin normal and skin intactno dry skin, no warmth, no erythema, no maceration, normal color, no pre-ulcer, no ulcer and no callus                         Toe Exam: ROM and strength within normal limits                   Sensory       Monofilament: diminished  Vascular  Capillary refills: < 3 seconds  The left DP pulse is 2+  Assign Risk Category:  No deformity present;  Loss of protective sensation;  No weak pulses       Risk: 1

## 2021-02-16 DIAGNOSIS — E11.65 TYPE 2 DIABETES MELLITUS WITH HYPERGLYCEMIA, WITHOUT LONG-TERM CURRENT USE OF INSULIN (HCC): ICD-10-CM

## 2021-02-21 DIAGNOSIS — E11.65 TYPE 2 DIABETES MELLITUS WITH HYPERGLYCEMIA, WITHOUT LONG-TERM CURRENT USE OF INSULIN (HCC): ICD-10-CM

## 2021-02-23 RX ORDER — EMPAGLIFLOZIN 10 MG/1
TABLET, FILM COATED ORAL
Qty: 90 TABLET | Refills: 1 | Status: SHIPPED | OUTPATIENT
Start: 2021-02-23 | End: 2021-08-23

## 2021-03-10 DIAGNOSIS — Z23 ENCOUNTER FOR IMMUNIZATION: ICD-10-CM

## 2021-03-17 ENCOUNTER — IMMUNIZATIONS (OUTPATIENT)
Dept: FAMILY MEDICINE CLINIC | Facility: HOSPITAL | Age: 61
End: 2021-03-17

## 2021-03-17 DIAGNOSIS — Z23 ENCOUNTER FOR IMMUNIZATION: Primary | ICD-10-CM

## 2021-03-17 PROCEDURE — 0011A SARS-COV-2 / COVID-19 MRNA VACCINE (MODERNA) 100 MCG: CPT

## 2021-03-17 PROCEDURE — 91301 SARS-COV-2 / COVID-19 MRNA VACCINE (MODERNA) 100 MCG: CPT

## 2021-04-15 ENCOUNTER — IMMUNIZATIONS (OUTPATIENT)
Dept: FAMILY MEDICINE CLINIC | Facility: HOSPITAL | Age: 61
End: 2021-04-15

## 2021-04-15 DIAGNOSIS — Z23 ENCOUNTER FOR IMMUNIZATION: Primary | ICD-10-CM

## 2021-04-15 PROCEDURE — 91301 SARS-COV-2 / COVID-19 MRNA VACCINE (MODERNA) 100 MCG: CPT

## 2021-04-15 PROCEDURE — 0012A SARS-COV-2 / COVID-19 MRNA VACCINE (MODERNA) 100 MCG: CPT

## 2021-04-27 DIAGNOSIS — E11.65 TYPE 2 DIABETES MELLITUS WITH HYPERGLYCEMIA, WITHOUT LONG-TERM CURRENT USE OF INSULIN (HCC): ICD-10-CM

## 2021-05-01 ENCOUNTER — NURSE TRIAGE (OUTPATIENT)
Dept: OTHER | Facility: OTHER | Age: 61
End: 2021-05-01

## 2021-05-01 DIAGNOSIS — E11.65 TYPE 2 DIABETES MELLITUS WITH HYPERGLYCEMIA, WITHOUT LONG-TERM CURRENT USE OF INSULIN (HCC): ICD-10-CM

## 2021-05-01 NOTE — TELEPHONE ENCOUNTER
Reason for Disposition   [1] Prescription not at pharmacy AND [2] was prescribed by PCP recently    Answer Assessment - Initial Assessment Questions  1  NAME of MEDICATION: "What medicine are you calling about?"      metformin  2  QUESTION: "What is your question?"      "Somehow the rx never got to the pharmacy "    Reviewed rx  Rx was set as "no print"  Re-sent to pharmacy as "normal"  Verified confirmation receipt of rx to pharmacy      Protocols used: MEDICATION QUESTION CALL-ADULT-

## 2021-05-01 NOTE — TELEPHONE ENCOUNTER
Regarding: medication refill   ----- Message from Cale Lubin sent at 5/1/2021 12:53 PM EDT -----  "  My Metformin 1000mg, never went through to pharmacy now I am completley out "

## 2021-07-14 DIAGNOSIS — I10 ESSENTIAL HYPERTENSION: ICD-10-CM

## 2021-07-14 RX ORDER — LISINOPRIL 10 MG/1
TABLET ORAL
Qty: 90 TABLET | Refills: 2 | Status: SHIPPED | OUTPATIENT
Start: 2021-07-14 | End: 2022-03-31 | Stop reason: SDUPTHER

## 2021-08-22 DIAGNOSIS — E11.65 TYPE 2 DIABETES MELLITUS WITH HYPERGLYCEMIA, WITHOUT LONG-TERM CURRENT USE OF INSULIN (HCC): ICD-10-CM

## 2021-08-23 RX ORDER — EMPAGLIFLOZIN 10 MG/1
TABLET, FILM COATED ORAL
Qty: 90 TABLET | Refills: 1 | Status: SHIPPED | OUTPATIENT
Start: 2021-08-23 | End: 2021-11-26 | Stop reason: ALTCHOICE

## 2021-08-23 NOTE — TELEPHONE ENCOUNTER
Patient needs diabetes follow-up as he has not been seen in the office since September of 2020  Will refill medication for now  Thank you

## 2021-09-03 ENCOUNTER — RA CDI HCC (OUTPATIENT)
Dept: OTHER | Facility: HOSPITAL | Age: 61
End: 2021-09-03

## 2021-09-03 NOTE — PROGRESS NOTES
[facility]  coding opportunities       Chart reviewed, no opportunity found: CHART REVIEWED, NO OPPORTUNITY FOUND                        Patients insurance company: [employer] (Medicare Advantage and Commercial)

## 2021-09-08 NOTE — PROGRESS NOTES
Subjective:           Problem List Items Addressed This Visit        Endocrine    Combined hyperlipidemia associated with type 2 diabetes mellitus ([address] )       Other    Annual physical exam - Primary    Gout    Relevant Medications    colchicine (COLCRYS) 0 6 mg tablet              No orders of the defined types were placed in this encounter  Patient Instructions   Stay current with screenings labs and vaccinations    BMI Counseling: Body mass index is 26 91 kg/m²  Discussed the patient's BMI with him  The BMI is above normal  Nutrition recommendations include reducing fast food intake, increasing intake of lean protein and reducing intake of cholesterol  Luis M Barillas    Chief Complaint   Patient presents with    Physical Exam     HPI  PE DM Hyperlipidemia gout    /64   Pulse 70   Temp 97 7 °F (36 5 °C)   Resp 18   Ht 5' 8" (1 727 m)   Wt 80 3 kg (177 lb)   SpO2 98%   BMI 26 91 kg/m²       Allergies   Allergen Reactions    Other      seasonal       Current Outpatient Medications on File Prior to Visit   Medication Sig Dispense Refill    aspirin (ECOTRIN LOW STRENGTH) 81 mg EC tablet Take 81 mg by mouth daily      atorvastatin (LIPITOR) 20 mg tablet Take 1 tablet (20 mg total) by mouth daily 90 tablet 3    HYDROcodone-acetaminophen (NORCO) 5-325 mg per tablet Take 1 tablet by mouth every 4 to 6 hours as needed for pain      Ibuprofen 200 MG CAPS Take 600 mg by mouth 2 (two) times a day      Jardiance 10 MG TABS TAKE 1 TABLET BY MOUTH EVERY DAY IN THE MORNING 90 tablet 1    lisinopril (ZESTRIL) 10 mg tablet TAKE 1 TABLET BY MOUTH EVERY DAY 90 tablet 2    metFORMIN (GLUCOPHAGE) 1000 MG tablet Take 1 tablet (1,000 mg total) by mouth daily with breakfast 180 tablet 0    Na Sulfate-K Sulfate-Mg Sulf (SUPREP BOWEL PREP KIT) 17 5-3 13-1 6 GM/177ML SOLN Take 2 Bottles by mouth see administration instructions Suprep bowel prep    Please follow the instructions from the office (Patient not taking: Reported on 9/8/2020) 2 Bottle 0    [DISCONTINUED] colchicine (COLCRYS) 0 6 mg tablet For acute attack take 1 2 mg at 1st sign of the flare then 0 6 mg 1 hour later not to exceed 1 8 in 1 hour  period (Patient not taking: Reported on 10/8/2020) 30 tablet 5    [DISCONTINUED] lisinopril (ZESTRIL) 10 mg tablet Take 1 tablet (10 mg total) by mouth daily 90 tablet 2     No current facility-administered medications on file prior to visit  Past Medical History:   Diagnosis Date    Hypertension        Past Surgical History:   Procedure Laterality Date    ANKLE FRACTURE SURGERY Right     orif    ANKLE HARDWARE REMOVAL Right     BACK SURGERY      laminectomy l 5    BICEPS TENDON REPAIR Right           reports that he has never smoked  He has never used smokeless tobacco  He reports current alcohol use  He reports that he does not use drugs  reports that he has never smoked  He has never used smokeless tobacco         Review of Systems   Constitutional: Negative for appetite change, diaphoresis and fever  HENT: Negative for congestion, ear pain, postnasal drip, sinus pressure, tinnitus and voice change  Eyes: Negative for discharge and itching  Respiratory: Negative for cough, chest tightness, shortness of breath and stridor  Cardiovascular: Negative for chest pain and palpitations  Gastrointestinal: Negative for abdominal distention, blood in stool, diarrhea and vomiting  Endocrine: Negative for cold intolerance  Genitourinary: Negative for flank pain, genital sores and testicular pain  Musculoskeletal: Positive for arthralgias and back pain  Negative for joint swelling, myalgias and neck pain  Shoulder R >L   Skin: Negative for rash  Neurological: Negative for tremors, speech difficulty and headaches  Hematological: Negative for adenopathy  Psychiatric/Behavioral: Negative for behavioral problems, dysphoric mood, hallucinations and suicidal ideas         Physical Exam  Vitals and nursing note reviewed  Constitutional:       Appearance: He is well-developed  HENT:      Head: Normocephalic and atraumatic  Right Ear: External ear normal       Left Ear: External ear normal       Mouth/Throat:      Pharynx: No oropharyngeal exudate  Eyes:      General: No scleral icterus  Right eye: No discharge  Left eye: No discharge  Conjunctiva/sclera: Conjunctivae normal       Pupils: Pupils are equal, round, and reactive to light  Neck:      Trachea: No tracheal deviation  Cardiovascular:      Rate and Rhythm: Normal rate and regular rhythm  Heart sounds: No murmur heard  No friction rub  Pulmonary:      Effort: Pulmonary effort is normal  No respiratory distress  Breath sounds: No stridor  No wheezing or rales  Abdominal:      General: Bowel sounds are normal  There is no distension  Palpations: Abdomen is soft  Tenderness: There is no guarding or rebound  Musculoskeletal:         General: No swelling or deformity  Cervical back: Normal range of motion and neck supple  Right lower leg: No edema  Comments: L shoulder AC crep limited ROM   Lymphadenopathy:      Cervical: No cervical adenopathy  Skin:     General: Skin is warm and dry  Capillary Refill: Capillary refill takes less than 2 seconds  Findings: No rash  Comments: Healed scar low lumbar tattoos    Neurological:      General: No focal deficit present  Mental Status: He is alert and oriented to person, place, and time  Cranial Nerves: No cranial nerve deficit  Motor: No abnormal muscle tone  Coordination: Coordination normal    Psychiatric:         Behavior: Behavior normal          Thought Content:  Thought content normal          Judgment: Judgment normal

## 2021-09-09 ENCOUNTER — OFFICE VISIT (OUTPATIENT)
Dept: FAMILY MEDICINE CLINIC | Facility: CLINIC | Age: 61
End: 2021-09-09
Payer: COMMERCIAL

## 2021-09-09 VITALS
HEART RATE: 70 BPM | WEIGHT: 177 LBS | RESPIRATION RATE: 18 BRPM | SYSTOLIC BLOOD PRESSURE: 104 MMHG | HEIGHT: 68 IN | OXYGEN SATURATION: 98 % | BODY MASS INDEX: 26.83 KG/M2 | DIASTOLIC BLOOD PRESSURE: 64 MMHG | TEMPERATURE: 97.7 F

## 2021-09-09 DIAGNOSIS — M10.479 OTHER SECONDARY ACUTE GOUT OF ANKLE, UNSPECIFIED LATERALITY: ICD-10-CM

## 2021-09-09 DIAGNOSIS — E11.69 COMBINED HYPERLIPIDEMIA ASSOCIATED WITH TYPE 2 DIABETES MELLITUS (HCC): ICD-10-CM

## 2021-09-09 DIAGNOSIS — E78.2 COMBINED HYPERLIPIDEMIA ASSOCIATED WITH TYPE 2 DIABETES MELLITUS (HCC): ICD-10-CM

## 2021-09-09 DIAGNOSIS — Z00.00 ANNUAL PHYSICAL EXAM: Primary | ICD-10-CM

## 2021-09-09 PROCEDURE — 3008F BODY MASS INDEX DOCD: CPT | Performed by: FAMILY MEDICINE

## 2021-09-09 PROCEDURE — 3725F SCREEN DEPRESSION PERFORMED: CPT | Performed by: FAMILY MEDICINE

## 2021-09-09 PROCEDURE — 99396 PREV VISIT EST AGE 40-64: CPT | Performed by: FAMILY MEDICINE

## 2021-09-09 PROCEDURE — 1036F TOBACCO NON-USER: CPT | Performed by: FAMILY MEDICINE

## 2021-09-09 RX ORDER — COLCHICINE 0.6 MG/1
TABLET ORAL
Qty: 30 TABLET | Refills: 5 | Status: SHIPPED | OUTPATIENT
Start: 2021-09-09 | End: 2022-07-26

## 2021-09-09 RX ORDER — HYDROCODONE BITARTRATE AND ACETAMINOPHEN 5; 325 MG/1; MG/1
1 TABLET ORAL
COMMUNITY
Start: 2021-07-26

## 2021-09-19 DIAGNOSIS — E11.65 TYPE 2 DIABETES MELLITUS WITH HYPERGLYCEMIA, WITHOUT LONG-TERM CURRENT USE OF INSULIN (HCC): ICD-10-CM

## 2021-09-30 ENCOUNTER — TELEPHONE (OUTPATIENT)
Dept: FAMILY MEDICINE CLINIC | Facility: CLINIC | Age: 61
End: 2021-09-30

## 2021-09-30 DIAGNOSIS — Z12.5 SCREENING FOR PROSTATE CANCER: Primary | ICD-10-CM

## 2021-09-30 LAB
ALBUMIN SERPL-MCNC: 4.5 G/DL (ref 3.6–5.1)
ALBUMIN/GLOB SERPL: 1.9 (CALC) (ref 1–2.5)
ALP SERPL-CCNC: 69 U/L (ref 35–144)
ALT SERPL-CCNC: 17 U/L (ref 9–46)
AST SERPL-CCNC: 16 U/L (ref 10–35)
BILIRUB SERPL-MCNC: 0.8 MG/DL (ref 0.2–1.2)
BUN SERPL-MCNC: 17 MG/DL (ref 7–25)
BUN/CREAT SERPL: ABNORMAL (CALC) (ref 6–22)
CALCIUM SERPL-MCNC: 9.7 MG/DL (ref 8.6–10.3)
CHLORIDE SERPL-SCNC: 100 MMOL/L (ref 98–110)
CHOLEST SERPL-MCNC: 127 MG/DL
CHOLEST/HDLC SERPL: 2.7 (CALC)
CO2 SERPL-SCNC: 32 MMOL/L (ref 20–32)
CREAT SERPL-MCNC: 0.9 MG/DL (ref 0.7–1.25)
GLOBULIN SER CALC-MCNC: 2.4 G/DL (CALC) (ref 1.9–3.7)
GLUCOSE SERPL-MCNC: 104 MG/DL (ref 65–99)
HBA1C MFR BLD: 5.6 % OF TOTAL HGB
HDLC SERPL-MCNC: 47 MG/DL
LDLC SERPL CALC-MCNC: 63 MG/DL (CALC)
NONHDLC SERPL-MCNC: 80 MG/DL (CALC)
POTASSIUM SERPL-SCNC: 4.3 MMOL/L (ref 3.5–5.3)
PROT SERPL-MCNC: 6.9 G/DL (ref 6.1–8.1)
SL AMB EGFR AFRICAN AMERICAN: 106 ML/MIN/1.73M2
SL AMB EGFR NON AFRICAN AMERICAN: 92 ML/MIN/1.73M2
SODIUM SERPL-SCNC: 138 MMOL/L (ref 135–146)
TRIGL SERPL-MCNC: 83 MG/DL

## 2021-09-30 PROCEDURE — 3044F HG A1C LEVEL LT 7.0%: CPT | Performed by: FAMILY MEDICINE

## 2021-09-30 NOTE — TELEPHONE ENCOUNTER
Patient had his blood drawn this morning and would like to know if you can add a PSA to the blood work  He has a family history     Please advse

## 2021-11-26 ENCOUNTER — OFFICE VISIT (OUTPATIENT)
Dept: FAMILY MEDICINE CLINIC | Facility: CLINIC | Age: 61
End: 2021-11-26
Payer: COMMERCIAL

## 2021-11-26 VITALS
HEIGHT: 68 IN | DIASTOLIC BLOOD PRESSURE: 70 MMHG | SYSTOLIC BLOOD PRESSURE: 114 MMHG | TEMPERATURE: 98.3 F | HEART RATE: 96 BPM | WEIGHT: 172.9 LBS | BODY MASS INDEX: 26.21 KG/M2 | OXYGEN SATURATION: 98 % | RESPIRATION RATE: 18 BRPM

## 2021-11-26 DIAGNOSIS — E11.69 TYPE 2 DIABETES MELLITUS WITH OTHER SPECIFIED COMPLICATION, WITHOUT LONG-TERM CURRENT USE OF INSULIN (HCC): ICD-10-CM

## 2021-11-26 DIAGNOSIS — Z11.4 SCREENING FOR HIV (HUMAN IMMUNODEFICIENCY VIRUS): ICD-10-CM

## 2021-11-26 DIAGNOSIS — S61.111D LACERATION OF RIGHT THUMB WITHOUT FOREIGN BODY WITH DAMAGE TO NAIL, SUBSEQUENT ENCOUNTER: Primary | ICD-10-CM

## 2021-11-26 DIAGNOSIS — Z23 ENCOUNTER FOR IMMUNIZATION: ICD-10-CM

## 2021-11-26 DIAGNOSIS — Z11.59 NEED FOR HEPATITIS C SCREENING TEST: ICD-10-CM

## 2021-11-26 PROBLEM — S61.111A LACERATION OF RIGHT THUMB WITHOUT FOREIGN BODY WITH DAMAGE TO NAIL: Status: ACTIVE | Noted: 2021-11-26

## 2021-11-26 PROCEDURE — 99213 OFFICE O/P EST LOW 20 MIN: CPT | Performed by: FAMILY MEDICINE

## 2021-11-26 PROCEDURE — 90682 RIV4 VACC RECOMBINANT DNA IM: CPT

## 2021-11-26 PROCEDURE — 1036F TOBACCO NON-USER: CPT | Performed by: FAMILY MEDICINE

## 2021-11-26 PROCEDURE — 3008F BODY MASS INDEX DOCD: CPT | Performed by: FAMILY MEDICINE

## 2021-11-26 PROCEDURE — 90471 IMMUNIZATION ADMIN: CPT

## 2021-11-26 RX ORDER — CEPHALEXIN 500 MG/1
500 CAPSULE ORAL 3 TIMES DAILY
COMMUNITY
Start: 2021-11-23 | End: 2021-12-03

## 2021-12-27 DIAGNOSIS — I10 ESSENTIAL HYPERTENSION: ICD-10-CM

## 2021-12-27 RX ORDER — ATORVASTATIN CALCIUM 20 MG/1
TABLET, FILM COATED ORAL
Qty: 90 TABLET | Refills: 3 | Status: SHIPPED | OUTPATIENT
Start: 2021-12-27

## 2021-12-30 DIAGNOSIS — E11.65 TYPE 2 DIABETES MELLITUS WITH HYPERGLYCEMIA, WITHOUT LONG-TERM CURRENT USE OF INSULIN (HCC): ICD-10-CM

## 2022-01-30 LAB
EST. AVERAGE GLUCOSE BLD GHB EST-MCNC: 126 MG/DL
EST. AVERAGE GLUCOSE BLD GHB EST-SCNC: 7 MMOL/L
HBA1C MFR BLD: 6 % OF TOTAL HGB
HBV SURFACE AB SERPL IA-ACNC: <5 MIU/ML
HIV 1+2 AB+HIV1 P24 AG SERPL QL IA: NORMAL

## 2022-03-31 DIAGNOSIS — I10 ESSENTIAL HYPERTENSION: ICD-10-CM

## 2022-03-31 RX ORDER — LISINOPRIL 10 MG/1
10 TABLET ORAL DAILY
Qty: 90 TABLET | Refills: 3 | Status: SHIPPED | OUTPATIENT
Start: 2022-03-31 | End: 2022-06-29

## 2022-06-29 ENCOUNTER — NURSE TRIAGE (OUTPATIENT)
Dept: OTHER | Facility: OTHER | Age: 62
End: 2022-06-29

## 2022-06-30 ENCOUNTER — TELEMEDICINE (OUTPATIENT)
Dept: FAMILY MEDICINE CLINIC | Facility: CLINIC | Age: 62
End: 2022-06-30
Payer: COMMERCIAL

## 2022-06-30 DIAGNOSIS — U07.1 COVID-19: Primary | ICD-10-CM

## 2022-06-30 PROCEDURE — 99213 OFFICE O/P EST LOW 20 MIN: CPT | Performed by: FAMILY MEDICINE

## 2022-06-30 NOTE — TELEPHONE ENCOUNTER
Regarding: Tested positive for covid was wondering if something can be call in   ----- Message from Dania Pruett sent at 6/29/2022  7:39 PM EDT -----  '' I tested positive for covid I was wondering if something can be called in ''

## 2022-06-30 NOTE — TELEPHONE ENCOUNTER
Reason for Disposition   HIGH RISK for severe COVID complications (e g , weak immune system, age > 59 years, obesity with BMI > 22, pregnant, chronic lung disease or other chronic medical condition)  (Exception: Already seen by PCP and no new or worsening symptoms )    Answer Assessment - Initial Assessment Questions  1  COVID-19 DIAGNOSIS: "Who made your COVID-19 diagnosis?" "Was it confirmed by a positive lab test or self-test?" If not diagnosed by a doctor (or NP/PA), ask "Are there lots of cases (community spread) where you live?" Note: See public health department website, if unsure  Tested positive 6/30  2  COVID-19 EXPOSURE: "Was there any known exposure to COVID before the symptoms began?" CDC Definition of close contact: within 6 feet (2 meters) for a total of 15 minutes or more over a 24-hour period  N/A  3  ONSET: "When did the COVID-19 symptoms start?"       Symptoms started Monday night/Tuesday morning   4  WORST SYMPTOM: "What is your worst symptom?" (e g , cough, fever, shortness of breath, muscle aches)      Denies SOB or chest pain; cough and headache worst symptom  5  COUGH: "Do you have a cough?" If Yes, ask: "How bad is the cough?"        Cough-bring up mucous  6  FEVER: "Do you have a fever?" If Yes, ask: "What is your temperature, how was it measured, and when did it start?"      Low grade fever-101 1; took tylenol  7  RESPIRATORY STATUS: "Describe your breathing?" (e g , shortness of breath, wheezing, unable to speak)       Breathing ok  8  BETTER-SAME-WORSE: "Are you getting better, staying the same or getting worse compared to yesterday?"  If getting worse, ask, "In what way?"      Worse  9  HIGH RISK DISEASE: "Do you have any chronic medical problems?" (e g , asthma, heart or lung disease, weak immune system, obesity, etc )      Type 2 Diabetes  10  VACCINE: "Have you had the COVID-19 vaccine?" If Yes, ask: "Which one, how many shots, when did you get it?"        Moderna  11  BOOSTER: "Have you received your COVID-19 booster?" If Yes, ask: "Which one and when did you get it?"        Moderna  12  PREGNANCY: "Is there any chance you are pregnant?" "When was your last menstrual period?"        N/A  13  OTHER SYMPTOMS: "Do you have any other symptoms?"  (e g , chills, fatigue, headache, loss of smell or taste, muscle pain, sore throat)       See above  14   O2 SATURATION MONITOR:  "Do you use an oxygen saturation monitor (pulse oximeter) at home?" If Yes, ask "What is your reading (oxygen level) today?" "What is your usual oxygen saturation reading?" (e g , 95%)        Doesn't have    Protocols used: CORONAVIRUS (COVID-19) DIAGNOSED OR SUSPECTED-ADULT-

## 2022-06-30 NOTE — TELEPHONE ENCOUNTER
Patient scheduled for OV with PCP tomorrow to discuss Paxlovid  Asked patient to call back with any worsening symptoms

## 2022-06-30 NOTE — PROGRESS NOTES
Virtual Brief Visit    Patient is located in the following state in which I hold an active license [place]  This was a telephone encounter with this patient secondary to complaints of the positive COVID test done at home  Patient states his symptoms began on Monday night with coughing and congestion  The patient arrived from work and took his temperature and his temperature was  101 1°  The patient took several doses of Tylenol to treat the  fever  The next morning the patient tested positive on home COVID test   His temperature came down with Tylenol down to 98 1  The patient also complained of a productive cough  He denies shortness of breath or chest pain  He did have some generalized achiness  He did also have a mild sore throat  The patient has a history of diabetes and hyperlipidemia  The patient states he has taken off of work until next Wednesday because of his symptoms  I had a discussion with the patient that he is a good candidate for Paxlovid  I will prescribe the medication for the patient  The patient was advised to call his office if his symptoms were to get worse  Assessment/Plan:    Problem List Items Addressed This Visit    None         Recent Visits  No visits were found meeting these conditions  Showing recent visits within past 7 days and meeting all other requirements  Future Appointments  No visits were found meeting these conditions    Showing future appointments within next 150 days and meeting all other requirements     I spent less than 15 minutes with the patient on this telephone visit

## 2022-07-18 ENCOUNTER — RA CDI HCC (OUTPATIENT)
Dept: OTHER | Facility: HOSPITAL | Age: 62
End: 2022-07-18

## 2022-07-18 NOTE — PROGRESS NOTES
[facility]  coding opportunities       Chart reviewed, no opportunity found: CHART REVIEWED, NO OPPORTUNITY FOUND        Patients Insurance        Commercial Insurance: Taisha Steele

## 2022-07-26 ENCOUNTER — OFFICE VISIT (OUTPATIENT)
Dept: FAMILY MEDICINE CLINIC | Facility: CLINIC | Age: 62
End: 2022-07-26
Payer: COMMERCIAL

## 2022-07-26 VITALS
HEART RATE: 77 BPM | RESPIRATION RATE: 16 BRPM | BODY MASS INDEX: 27.19 KG/M2 | HEIGHT: 68 IN | DIASTOLIC BLOOD PRESSURE: 90 MMHG | SYSTOLIC BLOOD PRESSURE: 128 MMHG | WEIGHT: 179.4 LBS | TEMPERATURE: 97.7 F | OXYGEN SATURATION: 99 %

## 2022-07-26 DIAGNOSIS — Z12.5 PROSTATE CANCER SCREENING: ICD-10-CM

## 2022-07-26 DIAGNOSIS — Z23 ENCOUNTER FOR IMMUNIZATION: ICD-10-CM

## 2022-07-26 DIAGNOSIS — Z00.00 ANNUAL PHYSICAL EXAM: ICD-10-CM

## 2022-07-26 DIAGNOSIS — E11.65 TYPE 2 DIABETES MELLITUS WITH HYPERGLYCEMIA, WITHOUT LONG-TERM CURRENT USE OF INSULIN (HCC): Primary | ICD-10-CM

## 2022-07-26 PROCEDURE — 90471 IMMUNIZATION ADMIN: CPT

## 2022-07-26 PROCEDURE — 90750 HZV VACC RECOMBINANT IM: CPT

## 2022-07-26 PROCEDURE — 99396 PREV VISIT EST AGE 40-64: CPT | Performed by: FAMILY MEDICINE

## 2022-07-26 RX ORDER — CETIRIZINE HYDROCHLORIDE 10 MG/1
10 TABLET ORAL DAILY
COMMUNITY

## 2022-07-26 RX ORDER — NAPROXEN 500 MG/1
500 TABLET ORAL
COMMUNITY
Start: 2022-07-21 | End: 2023-05-08

## 2022-07-26 RX ORDER — OXYCODONE HYDROCHLORIDE AND ACETAMINOPHEN 5; 325 MG/1; MG/1
1 TABLET ORAL EVERY 4 HOURS PRN
COMMUNITY
Start: 2022-07-21 | End: 2022-07-31

## 2022-07-26 RX ORDER — FLUTICASONE PROPIONATE 50 MCG
2 SPRAY, SUSPENSION (ML) NASAL DAILY
COMMUNITY

## 2022-07-26 NOTE — PROGRESS NOTES
[address] [facility]    NAME: Roque Vaz  AGE: 57 y o  SEX: male  : 1960     DATE: 2022     Assessment and Plan:     1  Annual physical exam  - no acute concern or complaints  - labs for chronic conditions as below    2  Type 2 diabetes mellitus with hyperglycemia, without long-term current use of insulin (HCC)  - appears well controlled  A1c last 6 0   - no DM foot exam  Repeat A1c in 1 year and if still controlled consider changing Dx to pre-diabetes and/or decreasing or stopping Metformin    3  Prostate cancer screening  - nocturia, post-void dribbling and weak urinary stream  - PSA, total and free; Future      4  Encounter for immunization  - routine  - Zoster Vaccine Recombinant IM      Immunizations and preventive care screenings were discussed with patient today  Appropriate education was printed on patient's after visit summary  No follow-ups on file  Chief Complaint:     Chief Complaint   Patient presents with    Annual Exam     Had surgery Thursday on left shoulder  History of Present Illness:     Adult Annual Physical   Patient here for a comprehensive physical exam  The patient reports no problems    Diet and Physical Activity  · Diet/Nutrition: limited junk food, consuming 3-5 servings of fruits/vegetables daily, adequate fiber intake and adequate whole grain intake  · Exercise: no formal exercise and Walks and lifts at work  Depression Screening  PHQ-2/9 Depression Screening         General Health  · Sleep: sleeps poorly, gets 4-6 hours of sleep on average, snores loudly, unrefreshing sleep and witnessed apnea  · Hearing: decreased - bilateral and requires use of hearing aids  · Vision: most recent eye exam >1 year ago and reading glasses only  · Dental: no dental visits for >1 year, brushes teeth twice daily and does not floss          Health  · Symptoms include: nocturia, post-void dribbling and weak urinary stream     Review of Systems:     Review of Systems   Constitutional: Positive for fatigue (long standing)  Negative for chills and fever  HENT: Positive for hearing loss and rhinorrhea (seasonal/environmental allergies only)  Negative for congestion, ear pain, sore throat and trouble swallowing  Eyes: Negative for pain and visual disturbance  Respiratory: Negative for cough and shortness of breath  Cardiovascular: Positive for chest pain (heavy pain)  Negative for palpitations  Gastrointestinal: Negative for abdominal pain, constipation, diarrhea, nausea and vomiting  Genitourinary: Negative for dysuria and hematuria  Musculoskeletal: Positive for arthralgias and back pain (on/off)  Skin: Negative for color change and rash  Neurological: Negative for dizziness, seizures, syncope and light-headedness  Psychiatric/Behavioral: Negative for decreased concentration  The patient is not nervous/anxious  All other systems reviewed and are negative  Past Medical History:     Past Medical History:   Diagnosis Date    Hypertension       Past Surgical History:     Past Surgical History:   Procedure Laterality Date    ANKLE FRACTURE SURGERY Right     orif    ANKLE HARDWARE REMOVAL Right     BACK SURGERY      laminectomy l 5    BICEPS TENDON REPAIR Right       Family History:     History reviewed  No pertinent family history  Social History:     Social History     Socioeconomic History    Marital status: /Civil Union     Spouse name: None    Number of children: None    Years of education: None    Highest education level: None   Occupational History    None   Tobacco Use    Smoking status: Never Smoker    Smokeless tobacco: Never Used   Substance and Sexual Activity    Alcohol use:  Yes    Drug use: Never    Sexual activity: None   Other Topics Concern    None   Social History Narrative    None     Social Determinants of Health     Financial Resource Strain: Not on file   Food Insecurity: Not on file   Transportation Needs: Not on file   Physical Activity: Not on file   Stress: Not on file   Social Connections: Not on file   Intimate Partner Violence: Not on file   Housing Stability: Not on file      Current Medications:     Current Outpatient Medications   Medication Sig Dispense Refill    aspirin (ECOTRIN LOW STRENGTH) 81 mg EC tablet Take 81 mg by mouth daily      atorvastatin (LIPITOR) 20 mg tablet TAKE 1 TABLET BY MOUTH EVERY DAY 90 tablet 3    cetirizine (ZyrTEC) 10 mg tablet Take 10 mg by mouth daily      fluticasone (FLONASE) 50 mcg/act nasal spray 2 sprays into each nostril daily      Ibuprofen 200 MG CAPS Take 600 mg by mouth 2 (two) times a day      metFORMIN (GLUCOPHAGE) 1000 MG tablet TAKE 1 TABLET BY MOUTH EVERY DAY WITH BREAKFAST 30 tablet 1    naproxen (NAPROSYN) 500 mg tablet Take 500 mg by mouth      oxyCODONE-acetaminophen (PERCOCET) 5-325 mg per tablet Take 1 tablet by mouth every 4 (four) hours as needed      HYDROcodone-acetaminophen (NORCO) 5-325 mg per tablet Take 1 tablet by mouth every 4 to 6 hours as needed for pain      lisinopril (ZESTRIL) 10 mg tablet Take 1 tablet (10 mg total) by mouth daily 90 tablet 3     No current facility-administered medications for this visit  Allergies: Allergies   Allergen Reactions    Other      seasonal      Physical Exam:     /90 (BP Location: Right arm, Patient Position: Sitting, Cuff Size: Standard)   Pulse 77   Temp 97 7 °F (36 5 °C) (Tympanic)   Resp 16   Ht 5' 8" (1 727 m)   Wt 81 4 kg (179 lb 6 4 oz)   SpO2 99%   BMI 27 28 kg/m²     Physical Exam  Vitals and nursing note reviewed  Constitutional:       General: He is not in acute distress  Appearance: He is well-developed  He is not ill-appearing or toxic-appearing  HENT:      Head: Normocephalic and atraumatic  Eyes:      Pupils: Pupils are equal, round, and reactive to light     Cardiovascular: Rate and Rhythm: Normal rate and regular rhythm  Pulmonary:      Effort: Pulmonary effort is normal  No respiratory distress  Abdominal:      Palpations: Abdomen is soft  Tenderness: There is no abdominal tenderness  Musculoskeletal:         General: No deformity  Cervical back: Neck supple  Right lower leg: No edema  Left lower leg: No edema  Skin:     General: Skin is warm and dry  Neurological:      Mental Status: He is alert and oriented to person, place, and time     Psychiatric:         Mood and Affect: Mood normal          Behavior: Behavior normal           Sam Souza DO  [address]

## 2022-08-18 NOTE — TELEPHONE ENCOUNTER
Colchine it is ordered
I also called patient to let him know I sent order for colograd
I ordered colograd but did not see new med on list ??
Patient called regarding medication he said was going to be ordered at his appt last week and wasn't   He also would like to do the cologuard instead of the colonoscopy
No

## 2022-09-09 ENCOUNTER — VBI (OUTPATIENT)
Dept: ADMINISTRATIVE | Facility: OTHER | Age: 62
End: 2022-09-09

## 2022-10-02 ENCOUNTER — OFFICE VISIT (OUTPATIENT)
Dept: URGENT CARE | Facility: MEDICAL CENTER | Age: 62
End: 2022-10-02
Payer: COMMERCIAL

## 2022-10-02 ENCOUNTER — APPOINTMENT (OUTPATIENT)
Dept: RADIOLOGY | Facility: MEDICAL CENTER | Age: 62
End: 2022-10-02
Payer: COMMERCIAL

## 2022-10-02 VITALS
HEART RATE: 92 BPM | DIASTOLIC BLOOD PRESSURE: 82 MMHG | SYSTOLIC BLOOD PRESSURE: 154 MMHG | TEMPERATURE: 97.6 F | HEIGHT: 68 IN | RESPIRATION RATE: 18 BRPM | BODY MASS INDEX: 27.74 KG/M2 | WEIGHT: 183 LBS | OXYGEN SATURATION: 98 %

## 2022-10-02 DIAGNOSIS — L08.9 TOE INFECTION: Primary | ICD-10-CM

## 2022-10-02 DIAGNOSIS — L08.9 TOE INFECTION: ICD-10-CM

## 2022-10-02 PROCEDURE — 99213 OFFICE O/P EST LOW 20 MIN: CPT | Performed by: PHYSICIAN ASSISTANT

## 2022-10-02 PROCEDURE — 73630 X-RAY EXAM OF FOOT: CPT

## 2022-10-02 RX ORDER — CEPHALEXIN 500 MG/1
500 CAPSULE ORAL EVERY 8 HOURS SCHEDULED
Qty: 21 CAPSULE | Refills: 0 | Status: SHIPPED | OUTPATIENT
Start: 2022-10-02 | End: 2022-10-09

## 2022-10-02 NOTE — PROGRESS NOTES
[address] [facility]        NAME: Jewell Farah is a 57 y o  male  : 1960    MRN: [id number]  DATE: 2022  TIME: 11:44 AM    Assessment and Plan   Toe infection [L08 9]  1  Toe infection  XR foot 3+ vw left    cephalexin (KEFLEX) 500 mg capsule         Patient Instructions       Follow up with PCP in 3-5 days  Proceed to  ER if symptoms worsen  Chief Complaint     Chief Complaint   Patient presents with    Toe Pain     Pt  Cut his left great toe when he was clipping his nails about two weeks ago  The toe became infected, and an abscess formed about a week ago  Pt  Popped area and has been keeping it cleaned, but a small area remains open and he continues to have pain  History of Present Illness       Patient is a 40-year-old male presents to the office complaining of a possible ingrown toenail to the left foot for last 2 weeks  Patient says he has some purulent discharge but continues to have some pain to the left great toe  Patient says he is prediabetic  Toe Pain   Incident onset: Approximately 2 weeks  Incident location: Possible ingrown toenail  There was no injury mechanism  Pain location: Left great toe  The quality of the pain is described as aching  The pain is at a severity of 2/10  The pain is mild  The pain has been constant since onset  He reports no foreign bodies present  The symptoms are aggravated by weight bearing  He has tried nothing for the symptoms  The treatment provided no relief  Review of Systems   Review of Systems   Constitutional: Negative  HENT: Negative  Eyes: Negative  Respiratory: Negative  Cardiovascular: Negative  Gastrointestinal: Negative  Endocrine: Negative  Genitourinary: Negative  Musculoskeletal:        Left great toe pain   Allergic/Immunologic: Negative  Neurological: Negative  Hematological: Negative  Psychiatric/Behavioral: Negative      All other systems reviewed and are negative  Current Medications       Current Outpatient Medications:     aspirin (ECOTRIN LOW STRENGTH) 81 mg EC tablet, Take 81 mg by mouth daily, Disp: , Rfl:     atorvastatin (LIPITOR) 20 mg tablet, TAKE 1 TABLET BY MOUTH EVERY DAY, Disp: 90 tablet, Rfl: 3    cephalexin (KEFLEX) 500 mg capsule, Take 1 capsule (500 mg total) by mouth every 8 (eight) hours for 7 days, Disp: 21 capsule, Rfl: 0    cetirizine (ZyrTEC) 10 mg tablet, Take 10 mg by mouth daily, Disp: , Rfl:     fluticasone (FLONASE) 50 mcg/act nasal spray, 2 sprays into each nostril daily, Disp: , Rfl:     metFORMIN (GLUCOPHAGE) 1000 MG tablet, TAKE 1 TABLET BY MOUTH EVERY DAY WITH BREAKFAST, Disp: 90 tablet, Rfl: 2    HYDROcodone-acetaminophen (NORCO) 5-325 mg per tablet, Take 1 tablet by mouth every 4 to 6 hours as needed for pain (Patient not taking: Reported on 10/2/2022), Disp: , Rfl:     Ibuprofen 200 MG CAPS, Take 600 mg by mouth 2 (two) times a day (Patient not taking: Reported on 10/2/2022), Disp: , Rfl:     lisinopril (ZESTRIL) 10 mg tablet, Take 1 tablet (10 mg total) by mouth daily, Disp: 90 tablet, Rfl: 3    naproxen (NAPROSYN) 500 mg tablet, Take 500 mg by mouth (Patient not taking: Reported on 10/2/2022), Disp: , Rfl:     Current Allergies     Allergies as of 10/02/2022 - Reviewed 10/02/2022   Allergen Reaction Noted    Other  02/28/2019            The following portions of the patient's history were reviewed and updated as appropriate: allergies, current medications, past family history, past medical history, past social history, past surgical history and problem list      Past Medical History:   Diagnosis Date    Hypertension        Past Surgical History:   Procedure Laterality Date    ANKLE FRACTURE SURGERY Right     orif    ANKLE HARDWARE REMOVAL Right     BACK SURGERY      laminectomy l 5    BICEPS TENDON REPAIR Right        History reviewed  No pertinent family history        Medications have been verified  Objective   /82   Pulse 92   Temp 97 6 °F (36 4 °C)   Resp 18   Ht 5' 8" (1 727 m)   Wt 83 kg (183 lb)   SpO2 98%   BMI 27 83 kg/m²   No LMP for male patient  Physical Exam     Physical Exam  Vitals and nursing note reviewed  Constitutional:       Appearance: He is normal weight  HENT:      Mouth/Throat:      Mouth: Mucous membranes are moist    Eyes:      General:         Right eye: No discharge  Left eye: No discharge  Extraocular Movements: Extraocular movements intact  Conjunctiva/sclera: Conjunctivae normal    Pulmonary:      Effort: Pulmonary effort is normal  No respiratory distress  Musculoskeletal:         General: Swelling present  No tenderness, deformity or signs of injury  Right lower leg: No edema  Left lower leg: No edema  Comments: Examination review on non cellulitic left great toe possible ingrown toenail  There is no streaking of fluctuation or abscess formation  Skin:     General: Skin is warm  Capillary Refill: Capillary refill takes less than 2 seconds  Coloration: Skin is not jaundiced or pale  Findings: No bruising, erythema, lesion or rash  Neurological:      General: No focal deficit present  Mental Status: He is alert and oriented to person, place, and time  Mental status is at baseline  Cranial Nerves: No cranial nerve deficit  Sensory: No sensory deficit  Motor: No weakness        Gait: Gait normal

## 2022-10-12 PROBLEM — S61.111A LACERATION OF RIGHT THUMB WITHOUT FOREIGN BODY WITH DAMAGE TO NAIL: Status: RESOLVED | Noted: 2021-11-26 | Resolved: 2022-10-12

## 2022-11-08 ENCOUNTER — TELEPHONE (OUTPATIENT)
Dept: FAMILY MEDICINE CLINIC | Facility: CLINIC | Age: 62
End: 2022-11-08

## 2022-12-24 ENCOUNTER — NURSE TRIAGE (OUTPATIENT)
Dept: OTHER | Facility: OTHER | Age: 62
End: 2022-12-24

## 2022-12-24 ENCOUNTER — TELEPHONE (OUTPATIENT)
Dept: FAMILY MEDICINE CLINIC | Facility: CLINIC | Age: 62
End: 2022-12-24

## 2022-12-24 DIAGNOSIS — M10.9 ACUTE GOUT OF HAND, UNSPECIFIED CAUSE, UNSPECIFIED LATERALITY: Primary | ICD-10-CM

## 2022-12-24 RX ORDER — PREDNISONE 20 MG/1
40 TABLET ORAL DAILY
Qty: 10 TABLET | Refills: 0 | Status: SHIPPED | OUTPATIENT
Start: 2022-12-24 | End: 2022-12-29

## 2022-12-24 RX ORDER — PANTOPRAZOLE SODIUM 40 MG/1
40 TABLET, DELAYED RELEASE ORAL DAILY
Qty: 30 TABLET | Refills: 0 | Status: SHIPPED | OUTPATIENT
Start: 2022-12-24 | End: 2023-01-23

## 2022-12-24 NOTE — TELEPHONE ENCOUNTER
Pt states he has had a gout attack last the last 4 days  Pt has been taking prescribed Colchicine () since and has been having 5-6 episodes of diarrhea a day  On- call paged and will be calling patient back to discuss  Reason for Disposition  • [1] MODERATE diarrhea (e g , 4-6 times / day more than normal) AND [2] present > 48 hours (2 days)    Answer Assessment - Initial Assessment Questions  1  DIARRHEA SEVERITY: "How bad is the diarrhea?" "How many extra stools have you had in the past 24 hours than normal?"     - NO DIARRHEA (SCALE 0)    - MILD (SCALE 1-3): Few loose or mushy BMs; increase of 1-3 stools over normal daily number of stools; mild increase in ostomy output  -  MODERATE (SCALE 4-7): Increase of 4-6 stools daily over normal; moderate increase in ostomy output  * SEVERE (SCALE 8-10; OR 'WORST POSSIBLE'): Increase of 7 or more stools daily over normal; moderate increase in ostomy output; incontinence  moderate  2  ONSET: "When did the diarrhea begin?"     4 days ago  3  BM CONSISTENCY: "How loose or watery is the diarrhea?"      diarrhea  4  VOMITING: "Are you also vomiting?" If Yes, ask: "How many times in the past 24 hours?"      denies  9  EXPOSURE: "Have you traveled to a foreign country recently?" "Have you been exposed to anyone with diarrhea?" "Could you have eaten any food that was spoiled?"     denies  10  ANTIBIOTIC USE: "Are you taking antibiotics now or have you taken antibiotics in the past 2 months?"       denies  11   OTHER SYMPTOMS: "Do you have any other symptoms?" (e g , fever, blood in stool)       Gout attack last 4 days ago    Protocols used: DIARRHEA-ADULT-

## 2022-12-24 NOTE — TELEPHONE ENCOUNTER
Regarding: Excessive Diarrhea  ----- Message from Kenn Esteban sent at 12/24/2022  9:01 AM EST -----  "I had a gout attack for a few days now and I have massive diarrhea  I've been taking Immodium and it has not helped   I am going about 5 - 6 times a day and I feel very weak "

## 2022-12-24 NOTE — TELEPHONE ENCOUNTER
Spoke with patient he reports gout like symptoms starting 5 days ago in his index finger with swelling, pain, redness and warmth  He denies any preceeding injury or pain in any other joints  He reports a hx of gout in his big toe joint years ago  He denies fever, chills or any systemic symptoms  He has been treating himself with  colchicine that he was prescribed years ago with limited relief  He reports diarrhea approximately 5 times a day since starting colchicine  Instructed him to discontinue colchicine, start prednisone with protonix and get a uric acid level in one week (orders placed)  Instructed him to call back if symptoms dont improve with new treatment plan  Yes

## 2023-01-06 ENCOUNTER — TELEPHONE (OUTPATIENT)
Dept: FAMILY MEDICINE CLINIC | Facility: CLINIC | Age: 63
End: 2023-01-06

## 2023-01-15 DIAGNOSIS — M10.9 ACUTE GOUT OF HAND, UNSPECIFIED CAUSE, UNSPECIFIED LATERALITY: ICD-10-CM

## 2023-01-16 RX ORDER — PANTOPRAZOLE SODIUM 40 MG/1
TABLET, DELAYED RELEASE ORAL
Qty: 90 TABLET | Refills: 1 | Status: SHIPPED | OUTPATIENT
Start: 2023-01-16

## 2023-02-02 DIAGNOSIS — I10 ESSENTIAL HYPERTENSION: ICD-10-CM

## 2023-02-03 RX ORDER — ATORVASTATIN CALCIUM 20 MG/1
TABLET, FILM COATED ORAL
Qty: 90 TABLET | Refills: 3 | Status: SHIPPED | OUTPATIENT
Start: 2023-02-03

## 2023-05-08 ENCOUNTER — OFFICE VISIT (OUTPATIENT)
Dept: FAMILY MEDICINE CLINIC | Facility: CLINIC | Age: 63
End: 2023-05-08

## 2023-05-08 VITALS
HEART RATE: 76 BPM | DIASTOLIC BLOOD PRESSURE: 69 MMHG | HEIGHT: 68 IN | TEMPERATURE: 97.6 F | SYSTOLIC BLOOD PRESSURE: 113 MMHG | RESPIRATION RATE: 17 BRPM | OXYGEN SATURATION: 97 % | WEIGHT: 185.9 LBS | BODY MASS INDEX: 28.17 KG/M2

## 2023-05-08 DIAGNOSIS — I10 ESSENTIAL HYPERTENSION: ICD-10-CM

## 2023-05-08 DIAGNOSIS — Z13.29 SCREENING FOR THYROID DISORDER: ICD-10-CM

## 2023-05-08 DIAGNOSIS — Z11.59 NEED FOR HEPATITIS C SCREENING TEST: ICD-10-CM

## 2023-05-08 DIAGNOSIS — Z23 ENCOUNTER FOR IMMUNIZATION: ICD-10-CM

## 2023-05-08 DIAGNOSIS — Z13.0 SCREENING FOR DEFICIENCY ANEMIA: ICD-10-CM

## 2023-05-08 DIAGNOSIS — E11.65 TYPE 2 DIABETES MELLITUS WITH HYPERGLYCEMIA, WITHOUT LONG-TERM CURRENT USE OF INSULIN (HCC): Primary | ICD-10-CM

## 2023-05-08 RX ORDER — LISINOPRIL 10 MG/1
10 TABLET ORAL DAILY
Qty: 90 TABLET | Refills: 3 | Status: SHIPPED | OUTPATIENT
Start: 2023-05-08 | End: 2024-05-02

## 2023-05-08 NOTE — ASSESSMENT & PLAN NOTE
/69 mmHg today in the clinic  At goal  Currently on Lisinopril       · Continue Lisinopril 10 mg, PO, QD   · Advised pt on BP goal   · Continue DASH-like diet and exercise regimen as tolerated   · Limit salt consumption  · Advised pt regarding S/Sx of HTN urgency/emergency and hypotension for which he should call the office or visit the ED

## 2023-05-08 NOTE — ASSESSMENT & PLAN NOTE
Lab Results   Component Value Date    HGBA1C 6 0 (H) 01/29/2022     Stable and at goal  Uses home metformin  Also on ARB and Statin       · Continue Metformin 1,000 mg, PO, QD   · Continue Atorvastatin 20 mg, PO, QD   · Continue Lisinopril 10 mg, PO, QD   · Carb controlled diet and exercise regimen as tolerated

## 2023-05-08 NOTE — PROGRESS NOTES
[facility]  Outpatient Visit - KIRSTEN Enriquez 9: 23     Patient's Information      Name: Eloisa Boyce  Age/Sex: 60 y o  male  MRN: [id number]  : 1960      Assessment/Plan     A/P: Eloisa Boyce is a 60 y o  male patient that came to the clinic today for follow up on medication reconciliation and FU on DM2 and HTN  Chart reviewed  Plan below  Essential hypertension    /69 mmHg today in the clinic  At goal  Currently on Lisinopril  · Continue Lisinopril 10 mg, PO, QD   · Advised pt on BP goal   · Continue DASH-like diet and exercise regimen as tolerated   · Limit salt consumption  · Advised pt regarding S/Sx of HTN urgency/emergency and hypotension for which he should call the office or visit the ED    Diabetes mellitus ([place] [address] )    Lab Results   Component Value Date    HGBA1C 6 0 (H) 2022     Stable and at goal  Uses home metformin  Also on ARB and Statin  · Continue Metformin 1,000 mg, PO, QD   · Continue Atorvastatin 20 mg, PO, QD   · Continue Lisinopril 10 mg, PO, QD   · Carb controlled diet and exercise regimen as tolerated   · Complete LP, CMP, Microalbumin/Cr ratio  · Repeat HbA1c and follow up every 3 - 6 months     Combined hyperlipidemia associated with type 2 diabetes mellitus ([facility] )    Stable  · Continue Atorvastatin 20 mg, PO, QD   · Continue healthy diet low in saturated fats  · Limit consumption of unhealthy fats   · Complete Lipid Panel     Associated orders were discussed and explained to the pt  Pertinent care gaps were addressed  Pt voiced understanding and acceptance with A/P  Pt will call the office if any further questions/concerns  Next Visit: Return for Annual physical in July  A/P of patient's case was discussed with the Attending, Dr Rudie Cushing Attendings: [place] [facility]      Subjective     History of Present Illness      Chief Complaint   Patient presents with   • Medication "Management     Lisinopril     60 y o  male patient came to the clinic for medication reconciliation  Has not been seen in the clinic since July 2022  Denies past medical history of diabetes found in 2020 with a hemoglobin A1c of 10 6  Patient's hemoglobin A1c's have been in the prediabetic range after and have been controlled  Patient currently taking metformin 1000 g a day  Also on a statin and an ACE inhibitor  States that he has a healthy diet and also walks in his work apartment and receives PT for his back  Patient states that he is compliant with his 3 medications  She has supplies to take blood pressure and blood glucose at home  Has not been measuring BP and BG  Patient states that he used to monitor at the beginning but has been stable  I reviewed patient's hx and updated as appropriate if needed: allergies, current medications, PMHx, FHx, social hx, surgical hx, and problem list       Objective     Vital Signs     Visit Vitals  /69   Pulse 76   Temp 97 6 °F (36 4 °C)   Resp 17   Ht 5' 8\" (1 727 m)   Wt 84 3 kg (185 lb 14 4 oz)   SpO2 97%   BMI 28 27 kg/m²   Smoking Status Never   BSA 1 98 m²      Physical Exam      Vitals and nursing note reviewed  Constitutional:       General: He is not in acute distress  Appearance: Normal appearance  He is well-developed and overweight  He is not ill-appearing or toxic-appearing  HENT:      Head: Normocephalic and atraumatic  Right Ear: External ear normal       Left Ear: External ear normal       Nose: Nose normal       Mouth/Throat:      Mouth: Mucous membranes are moist    Eyes:      General: No scleral icterus  Conjunctiva/sclera: Conjunctivae normal    Cardiovascular:      Rate and Rhythm: Normal rate  Pulses: Normal pulses  Pulmonary:      Effort: Pulmonary effort is normal    Abdominal:      General: Abdomen is flat  There is no distension  Palpations: Abdomen is soft     Musculoskeletal:         General: Normal " "range of motion  Cervical back: Normal range of motion  Neurological:      Mental Status: He is alert and oriented to person, place, and time  Psychiatric:         Mood and Affect: Mood normal          Behavior: Behavior normal  Behavior is cooperative  Thought Content: Thought content normal          Judgment: Judgment normal           It was a pleasure being of service to Inocencia Penny  Thank you  Cornelio Joe MD , MSMS  [address]      05/08/23   5:47 PM          Portions of the record may have been created with voice recognition software  Occasional wrong word or \"sound a like\" substitutions may have occurred due to the inherent limitations of voice recognition software  Read the chart carefully and recognize, using context, where substitutions have occurred     "

## 2023-05-08 NOTE — ASSESSMENT & PLAN NOTE
Lab Results   Component Value Date    HGBA1C 6 0 (H) 01/29/2022     Continue Atorvastatin 20 mg, PO, QD   Continue healthy diet low in saturated fats  Limit consumption of unhealthy fats   Complete Lipid Panel

## 2023-10-20 ENCOUNTER — TELEPHONE (OUTPATIENT)
Dept: FAMILY MEDICINE CLINIC | Facility: CLINIC | Age: 63
End: 2023-10-20

## 2023-12-28 ENCOUNTER — OFFICE VISIT (OUTPATIENT)
Dept: URGENT CARE | Facility: MEDICAL CENTER | Age: 63
End: 2023-12-28
Payer: COMMERCIAL

## 2023-12-28 VITALS
TEMPERATURE: 99.4 F | HEIGHT: 68 IN | BODY MASS INDEX: 28.79 KG/M2 | RESPIRATION RATE: 18 BRPM | SYSTOLIC BLOOD PRESSURE: 160 MMHG | WEIGHT: 190 LBS | DIASTOLIC BLOOD PRESSURE: 82 MMHG | HEART RATE: 102 BPM | OXYGEN SATURATION: 95 %

## 2023-12-28 DIAGNOSIS — J40 BRONCHITIS: Primary | ICD-10-CM

## 2023-12-28 DIAGNOSIS — Z20.822 ENCOUNTER FOR LABORATORY TESTING FOR COVID-19 VIRUS: ICD-10-CM

## 2023-12-28 LAB
SARS-COV-2 AG UPPER RESP QL IA: NEGATIVE
VALID CONTROL: NORMAL

## 2023-12-28 PROCEDURE — 87811 SARS-COV-2 COVID19 W/OPTIC: CPT | Performed by: PHYSICIAN ASSISTANT

## 2023-12-28 PROCEDURE — 99213 OFFICE O/P EST LOW 20 MIN: CPT | Performed by: PHYSICIAN ASSISTANT

## 2023-12-28 PROCEDURE — 87636 SARSCOV2 & INF A&B AMP PRB: CPT | Performed by: PHYSICIAN ASSISTANT

## 2023-12-28 RX ORDER — BENZONATATE 100 MG/1
100 CAPSULE ORAL 3 TIMES DAILY PRN
Qty: 20 CAPSULE | Refills: 0 | Status: SHIPPED | OUTPATIENT
Start: 2023-12-28

## 2023-12-28 RX ORDER — DOXYCYCLINE 100 MG/1
100 TABLET ORAL 2 TIMES DAILY
Qty: 20 TABLET | Refills: 0 | Status: SHIPPED | OUTPATIENT
Start: 2023-12-28 | End: 2024-01-07

## 2023-12-28 NOTE — PROGRESS NOTES
"[facility]        NAME: Nabil Andino is a 63 y.o. male  : 1960    MRN: [id number]  DATE: 2023  TIME: 2:11 PM    /82   Pulse 102   Temp 99.4 °F (37.4 °C) (Temporal)   Resp 18   Ht 5' 8\" (1.727 m)   Wt 86.2 kg (190 lb)   SpO2 95%   BMI 28.89 kg/m²     Assessment and Plan   Bronchitis [J40]  1. Bronchitis  Poct Covid 19 Rapid Antigen Test    benzonatate (TESSALON PERLES) 100 mg capsule    doxycycline (ADOXA) 100 MG tablet      2. Encounter for laboratory testing for COVID-19 virus              Patient Instructions       Follow up with PCP in 3-5 days.  Proceed to  ER if symptoms worsen.    Chief Complaint     Chief Complaint   Patient presents with    Cold Like Symptoms     Pt reports having cough, congestion with green mucus, runny nose, headache, body aches, fever x4 days.         History of Present Illness       Pt with 4 days productive cough nasal congestion and fever        Review of Systems   Review of Systems   Constitutional: Negative.    HENT:  Positive for congestion.    Eyes: Negative.    Respiratory: Negative.     Cardiovascular: Negative.    Gastrointestinal: Negative.    Endocrine: Negative.    Genitourinary: Negative.    Musculoskeletal: Negative.    Skin: Negative.    Allergic/Immunologic: Negative.    Neurological: Negative.    Hematological: Negative.    Psychiatric/Behavioral: Negative.     All other systems reviewed and are negative.        Current Medications       Current Outpatient Medications:     aspirin (ECOTRIN LOW STRENGTH) 81 mg EC tablet, Take 81 mg by mouth daily, Disp: , Rfl:     atorvastatin (LIPITOR) 20 mg tablet, TAKE 1 TABLET BY MOUTH EVERY DAY, Disp: 90 tablet, Rfl: 3    benzonatate (TESSALON PERLES) 100 mg capsule, Take 1 capsule (100 mg total) by mouth 3 (three) times a day as needed for cough, Disp: 20 capsule, Rfl: 0    cetirizine (ZyrTEC) 10 mg tablet, Take 10 mg by mouth daily, Disp: , Rfl:     doxycycline (ADOXA) 100 MG tablet, " "Take 1 tablet (100 mg total) by mouth 2 (two) times a day for 10 days, Disp: 20 tablet, Rfl: 0    fluticasone (FLONASE) 50 mcg/act nasal spray, 2 sprays into each nostril daily, Disp: , Rfl:     lisinopril (ZESTRIL) 10 mg tablet, Take 1 tablet (10 mg total) by mouth daily, Disp: 90 tablet, Rfl: 3    metFORMIN (GLUCOPHAGE) 1000 MG tablet, Take 1 tablet (1,000 mg total) by mouth daily with breakfast, Disp: 90 tablet, Rfl: 3    Current Allergies     Allergies as of 12/28/2023 - Reviewed 12/28/2023   Allergen Reaction Noted    Other  02/28/2019            The following portions of the patient's history were reviewed and updated as appropriate: allergies, current medications, past family history, past medical history, past social history, past surgical history and problem list.     Past Medical History:   Diagnosis Date    Hypertension        Past Surgical History:   Procedure Laterality Date    ANKLE FRACTURE SURGERY Right     orif    ANKLE HARDWARE REMOVAL Right     BACK SURGERY      laminectomy l 5    BICEPS TENDON REPAIR Right        No family history on file.      Medications have been verified.        Objective   /82   Pulse 102   Temp 99.4 °F (37.4 °C) (Temporal)   Resp 18   Ht 5' 8\" (1.727 m)   Wt 86.2 kg (190 lb)   SpO2 95%   BMI 28.89 kg/m²        Physical Exam     Physical Exam  Vitals and nursing note reviewed.   Constitutional:       Appearance: Normal appearance. He is normal weight.   HENT:      Head: Normocephalic and atraumatic.      Right Ear: Tympanic membrane, ear canal and external ear normal.      Left Ear: Tympanic membrane, ear canal and external ear normal.      Nose: Congestion and rhinorrhea present.      Comments: Boggy mucosa  yellow nasal d/c   Eyes:      Extraocular Movements: Extraocular movements intact.      Conjunctiva/sclera: Conjunctivae normal.      Pupils: Pupils are equal, round, and reactive to light.   Cardiovascular:      Rate and Rhythm: Normal rate and regular " rhythm.      Pulses: Normal pulses.      Heart sounds: Normal heart sounds.   Pulmonary:      Effort: Pulmonary effort is normal.      Comments: Minor coarse sounds right side   Abdominal:      General: Abdomen is flat. Bowel sounds are normal.      Palpations: Abdomen is soft.   Musculoskeletal:         General: Normal range of motion.      Cervical back: Normal range of motion and neck supple.   Skin:     General: Skin is warm.      Capillary Refill: Capillary refill takes less than 2 seconds.   Neurological:      Mental Status: He is alert and oriented to person, place, and time.

## 2023-12-29 ENCOUNTER — TELEPHONE (OUTPATIENT)
Dept: URGENT CARE | Facility: MEDICAL CENTER | Age: 63
End: 2023-12-29

## 2023-12-29 DIAGNOSIS — J11.1 INFLUENZA: Primary | ICD-10-CM

## 2023-12-29 LAB
FLUAV RNA RESP QL NAA+PROBE: POSITIVE
FLUBV RNA RESP QL NAA+PROBE: NEGATIVE
SARS-COV-2 RNA RESP QL NAA+PROBE: NEGATIVE

## 2023-12-29 RX ORDER — OSELTAMIVIR PHOSPHATE 75 MG/1
75 CAPSULE ORAL EVERY 12 HOURS SCHEDULED
Qty: 10 CAPSULE | Refills: 0 | Status: SHIPPED | OUTPATIENT
Start: 2023-12-29 | End: 2024-01-03

## 2024-01-22 ENCOUNTER — OFFICE VISIT (OUTPATIENT)
Dept: UROLOGY | Facility: CLINIC | Age: 64
End: 2024-01-22
Payer: COMMERCIAL

## 2024-01-22 VITALS
WEIGHT: 191.6 LBS | HEIGHT: 68 IN | RESPIRATION RATE: 16 BRPM | SYSTOLIC BLOOD PRESSURE: 145 MMHG | BODY MASS INDEX: 29.04 KG/M2 | OXYGEN SATURATION: 99 % | HEART RATE: 88 BPM | DIASTOLIC BLOOD PRESSURE: 67 MMHG

## 2024-01-22 DIAGNOSIS — R39.11 BENIGN PROSTATIC HYPERPLASIA WITH URINARY HESITANCY: Primary | ICD-10-CM

## 2024-01-22 DIAGNOSIS — I10 ESSENTIAL HYPERTENSION: ICD-10-CM

## 2024-01-22 DIAGNOSIS — N40.1 BENIGN PROSTATIC HYPERPLASIA WITH URINARY HESITANCY: Primary | ICD-10-CM

## 2024-01-22 PROCEDURE — 99203 OFFICE O/P NEW LOW 30 MIN: CPT | Performed by: UROLOGY

## 2024-01-22 RX ORDER — ATORVASTATIN CALCIUM 20 MG/1
TABLET, FILM COATED ORAL
Qty: 90 TABLET | Refills: 0 | Status: SHIPPED | OUTPATIENT
Start: 2024-01-22

## 2024-01-22 RX ORDER — TAMSULOSIN HYDROCHLORIDE 0.4 MG/1
0.4 CAPSULE ORAL
Qty: 90 CAPSULE | Refills: 3 | Status: SHIPPED | OUTPATIENT
Start: 2024-01-22

## 2024-01-22 NOTE — PROGRESS NOTES
"Nabil Andino is a(n) 63 y.o. male. , :  1960    Subjective   Chief Complaint:   Chief Complaint   Patient presents with    Establish Care     Diagnoses: There were no encounter diagnoses.    Assessment/Plan  63-year-old gentleman with slow urination.  Agreeable to trial of alpha-blocker.  Side effect discussed.  Best to take after a meal.  Plan: Flomax trial and follow-up in 4 to 6 weeks.    There are no Patient Instructions on file for this visit.     Radiology  None     History  BPH with obstruction.  Hesitancy and decreased stream      Prior Visits  None    2024 [facility]  63-year-old gentleman with slow urination.  Agreeable to trial of alpha-blocker.  Side effect discussed.  Best to take after a meal.  Plan: Flomax trial and follow-up in 4 to 6 weeks.    Lab Results   Component Value Date    PSA 0.3 2020    PSA 0.4 2019     No components found for: \"CR\"    Allergies   Allergen Reactions    Other      seasonal       Review of Systems    Past Surgical History:   Procedure Laterality Date    ANKLE FRACTURE SURGERY Right     orif    ANKLE HARDWARE REMOVAL Right     BACK SURGERY      laminectomy l 5    BICEPS TENDON REPAIR Right        History reviewed. No pertinent family history.    Social History     Socioeconomic History    Marital status: /Civil Union     Spouse name: Not on file    Number of children: Not on file    Years of education: Not on file    Highest education level: Not on file   Occupational History    Not on file   Tobacco Use    Smoking status: Never    Smokeless tobacco: Never   Substance and Sexual Activity    Alcohol use: Yes    Drug use: Never    Sexual activity: Not on file   Other Topics Concern    Not on file   Social History Narrative    Not on file     Social Determinants of Health     Financial Resource Strain: Not on file   Food Insecurity: Not on file   Transportation Needs: Not on file   Physical Activity: Not on file   Stress: Not on file " "  Social Connections: Not on file   Intimate Partner Violence: Not on file   Housing Stability: Not on file         Current Outpatient Medications:     aspirin (ECOTRIN LOW STRENGTH) 81 mg EC tablet, Take 81 mg by mouth daily, Disp: , Rfl:     atorvastatin (LIPITOR) 20 mg tablet, TAKE 1 TABLET BY MOUTH EVERY DAY, Disp: 90 tablet, Rfl: 0    cetirizine (ZyrTEC) 10 mg tablet, Take 10 mg by mouth daily, Disp: , Rfl:     fluticasone (FLONASE) 50 mcg/act nasal spray, 2 sprays into each nostril daily, Disp: , Rfl:     lisinopril (ZESTRIL) 10 mg tablet, Take 1 tablet (10 mg total) by mouth daily, Disp: 90 tablet, Rfl: 3    metFORMIN (GLUCOPHAGE) 1000 MG tablet, Take 1 tablet (1,000 mg total) by mouth daily with breakfast, Disp: 90 tablet, Rfl: 3    Objective     /67 (BP Location: Left arm, Patient Position: Sitting, Cuff Size: Large)   Pulse 88   Resp 16   Ht 5' 8\" (1.727 m)   Wt 86.9 kg (191 lb 9.6 oz)   SpO2 99%   BMI 29.13 kg/m²     Physical Exam  Constitutional:       Appearance: Normal appearance. He is not toxic-appearing.   HENT:      Head: Normocephalic.   Cardiovascular:      Rate and Rhythm: Normal rate.      Pulses: Normal pulses.   Pulmonary:      Effort: Pulmonary effort is normal.   Genitourinary:     Comments: Prostate is enlarged probably 50 g no nodules or tenderness.  Musculoskeletal:         General: No swelling.   Skin:     General: Skin is warm and dry.      Capillary Refill: Capillary refill takes less than 2 seconds.      Coloration: Skin is not jaundiced.   Neurological:      General: No focal deficit present.      Mental Status: He is alert and oriented to person, place, and time.           [facility]  "

## 2024-03-11 ENCOUNTER — OFFICE VISIT (OUTPATIENT)
Age: 64
End: 2024-03-11

## 2024-03-11 ENCOUNTER — OFFICE VISIT (OUTPATIENT)
Dept: UROLOGY | Facility: CLINIC | Age: 64
End: 2024-03-11
Payer: COMMERCIAL

## 2024-03-11 VITALS
OXYGEN SATURATION: 99 % | DIASTOLIC BLOOD PRESSURE: 72 MMHG | HEART RATE: 74 BPM | WEIGHT: 191.9 LBS | SYSTOLIC BLOOD PRESSURE: 130 MMHG | BODY MASS INDEX: 29.08 KG/M2 | TEMPERATURE: 98.2 F | HEIGHT: 68 IN

## 2024-03-11 VITALS
DIASTOLIC BLOOD PRESSURE: 70 MMHG | BODY MASS INDEX: 29.55 KG/M2 | OXYGEN SATURATION: 97 % | WEIGHT: 195 LBS | HEIGHT: 68 IN | HEART RATE: 82 BPM | SYSTOLIC BLOOD PRESSURE: 118 MMHG

## 2024-03-11 DIAGNOSIS — Z12.5 PROSTATE CANCER SCREENING: Primary | ICD-10-CM

## 2024-03-11 DIAGNOSIS — I10 ESSENTIAL HYPERTENSION: ICD-10-CM

## 2024-03-11 DIAGNOSIS — E11.9 TYPE 2 DIABETES MELLITUS WITHOUT COMPLICATION, WITHOUT LONG-TERM CURRENT USE OF INSULIN (HCC): ICD-10-CM

## 2024-03-11 DIAGNOSIS — Z13.29 SCREENING FOR THYROID DISORDER: ICD-10-CM

## 2024-03-11 DIAGNOSIS — Z00.00 ANNUAL PHYSICAL EXAM: Primary | ICD-10-CM

## 2024-03-11 DIAGNOSIS — E78.5 HYPERLIPIDEMIA, UNSPECIFIED HYPERLIPIDEMIA TYPE: ICD-10-CM

## 2024-03-11 DIAGNOSIS — R39.11 BENIGN PROSTATIC HYPERPLASIA WITH URINARY HESITANCY: ICD-10-CM

## 2024-03-11 DIAGNOSIS — Z12.11 SCREENING FOR COLON CANCER: ICD-10-CM

## 2024-03-11 DIAGNOSIS — N40.1 BENIGN PROSTATIC HYPERPLASIA WITH URINARY HESITANCY: ICD-10-CM

## 2024-03-11 DIAGNOSIS — F32.A MILD DEPRESSION: ICD-10-CM

## 2024-03-11 PROCEDURE — 99213 OFFICE O/P EST LOW 20 MIN: CPT | Performed by: UROLOGY

## 2024-03-11 PROCEDURE — 99396 PREV VISIT EST AGE 40-64: CPT | Performed by: FAMILY MEDICINE

## 2024-03-11 RX ORDER — TAMSULOSIN HYDROCHLORIDE 0.4 MG/1
0.4 CAPSULE ORAL
Qty: 180 CAPSULE | Refills: 3 | Status: SHIPPED | OUTPATIENT
Start: 2024-03-11 | End: 2024-03-19 | Stop reason: SDUPTHER

## 2024-03-11 NOTE — PROGRESS NOTES
"Nabil Andino is a(n) 63 y.o. male. , :  1960    Subjective   Chief Complaint:   Chief Complaint   Patient presents with    Follow-up     Diagnoses: The encounter diagnosis was Prostate cancer screening.    Assessment/Plan  BPH.   Good ISMAEL. Plan: double flomax.  ISMAEL one year. Follow up 6 months. Screening PSA test.    Patient Instructions   Call if urination no better. Follow up in 6 months.     Radiology  None      History  BPH with obstruction.  Hesitancy and decreased stream. Slightly better on flomax daily.  Change to twice daily 3/2023.         Prior Visits  None     2024 [facility]  63-year-old gentleman with slow urination.  Agreeable to trial of alpha-blocker.  Side effect discussed.  Best to take after a meal.  Plan: Flomax trial and follow-up in 4 to 6 weeks.       3/11/2024 [facility]  63-year-old gentleman with slow urination.  Started alpha-blocker 2024.  Here to discuss results. Still getting up and still dribbling a little bit.  No dizziness.  Eats breakfast so will try changing to the AM.  Was taking the pills 5:30 to 6:30.  Able to fight the need to urinate in the middle of the night.  Plan: Continue the flomax.  Maybe try it in the morning.  May benefit from twice a day dosing and he should let me know if that works better so we can adjust the script.    Lab Results   Component Value Date    PSA 0.3 2020    PSA 0.4 2019     No results found for: \"TESTOSTERONE\"  No components found for: \"CR\"    Allergies   Allergen Reactions    Other      seasonal       Review of Systems    Past Surgical History:   Procedure Laterality Date    ANKLE FRACTURE SURGERY Right     orif    ANKLE HARDWARE REMOVAL Right     BACK SURGERY      laminectomy l 5    BICEPS TENDON REPAIR Right        History reviewed. No pertinent family history.    Social History     Socioeconomic History    Marital status: /Civil Union     Spouse name: Not on file    Number of children: Not on file " "   Years of education: Not on file    Highest education level: Not on file   Occupational History    Not on file   Tobacco Use    Smoking status: Never    Smokeless tobacco: Never   Vaping Use    Vaping status: Never Used   Substance and Sexual Activity    Alcohol use: Yes    Drug use: Never    Sexual activity: Not on file   Other Topics Concern    Not on file   Social History Narrative    Not on file     Social Determinants of Health     Financial Resource Strain: Not on file   Food Insecurity: Not on file   Transportation Needs: Not on file   Physical Activity: Not on file   Stress: Not on file   Social Connections: Not on file   Intimate Partner Violence: Not on file   Housing Stability: Not on file         Current Outpatient Medications:     aspirin (ECOTRIN LOW STRENGTH) 81 mg EC tablet, Take 81 mg by mouth daily, Disp: , Rfl:     atorvastatin (LIPITOR) 20 mg tablet, TAKE 1 TABLET BY MOUTH EVERY DAY, Disp: 90 tablet, Rfl: 0    cetirizine (ZyrTEC) 10 mg tablet, Take 10 mg by mouth daily, Disp: , Rfl:     fluticasone (FLONASE) 50 mcg/act nasal spray, 2 sprays into each nostril daily, Disp: , Rfl:     lisinopril (ZESTRIL) 10 mg tablet, Take 1 tablet (10 mg total) by mouth daily, Disp: 90 tablet, Rfl: 3    metFORMIN (GLUCOPHAGE) 1000 MG tablet, Take 1 tablet (1,000 mg total) by mouth daily with breakfast, Disp: 90 tablet, Rfl: 3    tamsulosin (FLOMAX) 0.4 mg, Take 1 capsule (0.4 mg total) by mouth daily with dinner, Disp: 90 capsule, Rfl: 3    Objective     /70 (Patient Position: Sitting, Cuff Size: Adult)   Pulse 82   Ht 5' 8\" (1.727 m)   Wt 88.5 kg (195 lb)   SpO2 97%   BMI 29.65 kg/m²     Physical Exam  Genitourinary:     Comments: 40gm no nodules NT          [facility]  "

## 2024-03-11 NOTE — PROGRESS NOTES
ADULT ANNUAL PHYSICAL  [facility]    NAME: Nabil Andino    AGE: 63 y.o.   SEX: Male  : 1960     DATE: 3/11/2024     Assessment and Plan:     Problem List Items Addressed This Visit       Essential hypertension    Relevant Orders    Albumin / creatinine urine ratio    Comprehensive metabolic panel     Other Visit Diagnoses       Annual physical exam    -  Primary    BMI 29.0-29.9,adult        Type 2 diabetes mellitus without complication, without long-term current use of insulin (HCC)        Relevant Orders    Hemoglobin A1C    Albumin / creatinine urine ratio    Comprehensive metabolic panel    Hyperlipidemia, unspecified hyperlipidemia type        Relevant Orders    Lipid panel    Mild depression        Screening for colon cancer        Relevant Orders    Ambulatory referral to Gastroenterology    Screening for thyroid disorder        Relevant Orders    TSH, 3rd generation with Free T4 reflex        Immunizations and preventive care screenings were discussed with patient today. Appropriate education was printed on patient's after visit summary.    Discussed risks and benefits of prostate cancer screening. We discussed the controversial history of PSA screening for prostate cancer in the [place] as well as the risk of over detection and over treatment of prostate cancer by way of PSA screening.  The patient understands that PSA blood testing is an imperfect way to screen for prostate cancer and that elevated PSA levels in the blood may also be caused by infection, inflammation, prostatic trauma or manipulation, urological procedures, or by benign prostatic enlargement.    The role of the digital rectal examination in prostate cancer screening was also discussed and I discussed with him that there is large interobserver variability in the findings of digital rectal examination.    Counseling:    Alcohol/drug use: discussed  moderation in alcohol intake, the recommendations for healthy alcohol use, and avoidance of illicit drug use.  Dental Health: discussed importance of regular tooth brushing, flossing, and dental visits.  Injury prevention: discussed safety/seat belts, safety helmets, smoke detectors, carbon dioxide detectors, and smoking near bedding or upholstery.  Sexual health: discussed sexually transmitted diseases, partner selection, use of condoms, avoidance of unintended pregnancy, and contraceptive alternatives.  Exercise: the importance of regular exercise/physical activity was discussed. Recommend exercise 3-5 times per week for at least 30 minutes.     BMI Counseling: Body mass index is 29.18 kg/m². The BMI is above normal. Nutrition recommendations include encouraging healthy choices of fruits and vegetables, consuming healthier snacks and moderation in carbohydrate intake. Exercise recommendations include exercising 3-5 times per week. Patient referred to PCP. Rationale for BMI follow-up plan is due to patient being overweight or obese.     Depression Screening and Follow-up Plan: Patient was screened for depression during today's encounter. They screened negative with a PHQ-2 score of 2.    Return in about 3 months (around 6/11/2024) for follow up on DM and HTN .     Chief Complaint:     Chief Complaint   Patient presents with    Physical Exam      History of Present Illness:     Adult Annual Physical    Patient here for a comprehensive physical exam. The patient reports no problems.    Diet and Physical Activity    Diet/Nutrition: well balanced diet and carb moderation  .   Exercise: walking.      Depression Screening    PHQ-2/9 Depression Screening    Little interest or pleasure in doing things: 1 - several days  Feeling down, depressed, or hopeless: 1 - several days  Trouble falling or staying asleep, or sleeping too much: 3 - nearly every day  Feeling tired or having little energy: 2 - more than half the  days  Poor appetite or overeatin - not at all  Feeling bad about yourself - or that you are a failure or have let yourself or your family down: 0 - not at all  Trouble concentrating on things, such as reading the newspaper or watching television: 1 - several days  Moving or speaking so slowly that other people could have noticed. Or the opposite - being so fidgety or restless that you have been moving around a lot more than usual: 0 - not at all  Thoughts that you would be better off dead, or of hurting yourself in some way: 0 - not at all  PHQ-2 Score: 2  PHQ-2 Interpretation: Negative depression screen  PHQ-9 Score: 8  PHQ-9 Interpretation: Mild depression     General Health    Sleep: sleeps poorly and gets 4-6 hours of sleep on average.   Hearing: requires use of hearing aids and tinnitus L>R.  Vision: goes for regular eye exams.   Dental: regular dental visits, brushes teeth twice daily, and flosses teeth occasionally.        Health    Symptoms include: post-void dribbling and weak urinary stream - follows up with urology     Advanced Care Planning    Do you have an advanced directive? no  Do you have a durable medical power of ? Yes - Wife Marisol Sina  ACP document given to patient? no     Review of Systems:     Review of Systems   Constitutional:  Negative for chills and fever.   HENT:  Negative for ear pain and sore throat.    Eyes:  Negative for pain and visual disturbance.   Respiratory:  Negative for cough and shortness of breath.    Cardiovascular:  Negative for chest pain and palpitations.   Gastrointestinal:  Negative for abdominal pain and vomiting.   Genitourinary:  Negative for dysuria and hematuria.   Musculoskeletal:  Negative for arthralgias and back pain.   Skin:  Positive for rash (Skin dryness and bothersome). Negative for color change.   Neurological:  Negative for seizures and syncope.   All other systems reviewed and are negative.     Past Medical History:     Past Medical  History:   Diagnosis Date    Hypertension       Past Surgical History:     Past Surgical History:   Procedure Laterality Date    ANKLE FRACTURE SURGERY Right     orif    ANKLE HARDWARE REMOVAL Right     BACK SURGERY      laminectomy l 5    BICEPS TENDON REPAIR Right       Family History:     History reviewed. No pertinent family history.   Social History:     Social History     Socioeconomic History    Marital status: /Civil Union     Spouse name: None    Number of children: None    Years of education: None    Highest education level: None   Occupational History    None   Tobacco Use    Smoking status: Never    Smokeless tobacco: Never   Vaping Use    Vaping status: Never Used   Substance and Sexual Activity    Alcohol use: Yes    Drug use: Never    Sexual activity: None   Other Topics Concern    None   Social History Narrative    None     Social Determinants of Health     Financial Resource Strain: Low Risk  (3/11/2024)    Overall Financial Resource Strain (CARDIA)     Difficulty of Paying Living Expenses: Not very hard   Food Insecurity: No Food Insecurity (3/11/2024)    Hunger Vital Sign     Worried About Running Out of Food in the Last Year: Never true     Ran Out of Food in the Last Year: Never true   Transportation Needs: No Transportation Needs (3/11/2024)    PRAPARE - Transportation     Lack of Transportation (Medical): No     Lack of Transportation (Non-Medical): No   Physical Activity: Not on file   Stress: Not on file   Social Connections: Not on file   Intimate Partner Violence: Not on file   Housing Stability: Not on file      Current Medications:     Current Outpatient Medications   Medication Sig Dispense Refill    aspirin (ECOTRIN LOW STRENGTH) 81 mg EC tablet Take 81 mg by mouth daily      atorvastatin (LIPITOR) 20 mg tablet TAKE 1 TABLET BY MOUTH EVERY DAY 90 tablet 0    cetirizine (ZyrTEC) 10 mg tablet Take 10 mg by mouth daily      fluticasone (FLONASE) 50 mcg/act nasal spray 2 sprays into  "each nostril daily      lisinopril (ZESTRIL) 10 mg tablet Take 1 tablet (10 mg total) by mouth daily 90 tablet 3    metFORMIN (GLUCOPHAGE) 1000 MG tablet Take 1 tablet (1,000 mg total) by mouth daily with breakfast 90 tablet 3    tamsulosin (FLOMAX) 0.4 mg Take 1 capsule (0.4 mg total) by mouth 2 (two) times daily after meals 180 capsule 3     No current facility-administered medications for this visit.      Allergies:     Allergies   Allergen Reactions    Other      seasonal      Physical Exam:     /72 (BP Location: Left arm, Patient Position: Sitting, Cuff Size: Standard)   Pulse 74   Temp 98.2 °F (36.8 °C) (Tympanic)   Ht 5' 8\" (1.727 m)   Wt 87 kg (191 lb 14.4 oz)   SpO2 99%   BMI 29.18 kg/m²     Physical Exam  Vitals and nursing note reviewed.   Constitutional:       General: He is not in acute distress.     Appearance: Normal appearance. He is well-developed and overweight. He is not ill-appearing or toxic-appearing.   HENT:      Head: Normocephalic and atraumatic.      Right Ear: Tympanic membrane, ear canal and external ear normal. There is no impacted cerumen.      Left Ear: Tympanic membrane, ear canal and external ear normal. There is no impacted cerumen.      Nose: Nose normal.      Comments: Dry nasal mucosa     Mouth/Throat:      Mouth: Mucous membranes are moist.   Eyes:      General: No scleral icterus.        Right eye: No discharge.         Left eye: No discharge.      Extraocular Movements: Extraocular movements intact.      Conjunctiva/sclera: Conjunctivae normal.      Pupils: Pupils are equal, round, and reactive to light.   Cardiovascular:      Rate and Rhythm: Normal rate and regular rhythm.      Pulses: Normal pulses.      Heart sounds: Normal heart sounds. No murmur heard.  Pulmonary:      Effort: Pulmonary effort is normal. No respiratory distress.      Breath sounds: Normal breath sounds.   Abdominal:      General: Abdomen is flat. Bowel sounds are normal. There is no " distension.      Palpations: Abdomen is soft.      Tenderness: There is no abdominal tenderness.   Musculoskeletal:         General: No swelling. Normal range of motion.      Cervical back: Normal range of motion and neck supple. No rigidity or tenderness.      Right lower leg: No edema.      Left lower leg: No edema.   Lymphadenopathy:      Cervical: No cervical adenopathy.   Skin:     General: Skin is warm and dry.      Capillary Refill: Capillary refill takes less than 2 seconds.   Neurological:      Mental Status: He is alert and oriented to person, place, and time.      Motor: No weakness.      Gait: Gait normal.   Psychiatric:         Mood and Affect: Mood normal.         Behavior: Behavior normal.         Thought Content: Thought content normal.         Judgment: Judgment normal.     Berta Whalen MD  [facility]

## 2024-03-12 ENCOUNTER — TELEPHONE (OUTPATIENT)
Age: 64
End: 2024-03-12

## 2024-03-12 ENCOUNTER — PREP FOR PROCEDURE (OUTPATIENT)
Age: 64
End: 2024-03-12

## 2024-03-12 DIAGNOSIS — Z12.11 SCREENING FOR COLON CANCER: Primary | ICD-10-CM

## 2024-03-12 NOTE — TELEPHONE ENCOUNTER
03/12/24  Screened by: Francesca Ordaz    Referring Provider      Pre- Screening:     There is no height or weight on file to calculate BMI. 191lbs 29.18  Has patient been referred for a routine screening Colonoscopy? yes  Is the patient between 45-75 years old? yes      Previous Colonoscopy yes   If yes: 5 years    Date:     Facility:     Reason:     Does the patient want to see a Gastroenterologist prior to their procedure OR are they having any GI symptoms? no    Has the patient been hospitalized or had abdominal surgery in the past 6 months? no    Does the patient use supplemental oxygen? no    Does the patient take Coumadin, Lovenox, Plavix, Elliquis, Xarelto, or other blood thinning medication? no    Has the patient had a stroke, cardiac event, or stent placed in the past year? no      If patient is between 45yrs - 49yrs, please advise patient that we will have to confirm benefits & coverage with their insurance company for a routine screening colonoscopy.

## 2024-03-12 NOTE — TELEPHONE ENCOUNTER
Scheduled date of colonoscopy (as of today):5/24/2024  Physician performing colonoscopy:Dr Shukla  Location of colonoscopy:[facility]  Bowel prep reviewed with patient:Miralax/Dulcolax Diabetic  Instructions reviewed with patient by:sent via email  Clearances: N/A

## 2024-03-19 DIAGNOSIS — I10 ESSENTIAL HYPERTENSION: ICD-10-CM

## 2024-03-19 DIAGNOSIS — R39.11 BENIGN PROSTATIC HYPERPLASIA WITH URINARY HESITANCY: ICD-10-CM

## 2024-03-19 DIAGNOSIS — N40.1 BENIGN PROSTATIC HYPERPLASIA WITH URINARY HESITANCY: ICD-10-CM

## 2024-03-19 DIAGNOSIS — E11.65 TYPE 2 DIABETES MELLITUS WITH HYPERGLYCEMIA, WITHOUT LONG-TERM CURRENT USE OF INSULIN (HCC): ICD-10-CM

## 2024-03-19 RX ORDER — LISINOPRIL 10 MG/1
10 TABLET ORAL DAILY
Qty: 90 TABLET | Refills: 3 | Status: SHIPPED | OUTPATIENT
Start: 2024-03-19 | End: 2025-03-14

## 2024-03-19 RX ORDER — ATORVASTATIN CALCIUM 20 MG/1
20 TABLET, FILM COATED ORAL DAILY
Qty: 90 TABLET | Refills: 0 | Status: SHIPPED | OUTPATIENT
Start: 2024-03-19

## 2024-03-19 RX ORDER — TAMSULOSIN HYDROCHLORIDE 0.4 MG/1
0.4 CAPSULE ORAL
Qty: 180 CAPSULE | Refills: 1 | Status: SHIPPED | OUTPATIENT
Start: 2024-03-19 | End: 2025-03-19

## 2024-03-19 NOTE — TELEPHONE ENCOUNTER
Resending script dated 03/11/24 since it does not show confirmed receipt    Reason for call:   [x] Refill   [] Prior Auth  [] Other:     Office:   [] PCP/Provider -   [x] Specialty/Provider - uro / [facility]    Medication: tamsulosin    Dose/Frequency: 0.4mg bid    Quantity: 180    Pharmacy: [facility]/pharmacy #[id number] - [place] [address]     Does the patient have enough for 3 days?   [x] Yes   [] No - Send as HP to POD

## 2024-03-19 NOTE — TELEPHONE ENCOUNTER
VM on rx line:    Hi Morteza STEWARTMAN birthday 1960. My cell is [phone]. I was in for a physical a week ago and I was asked if I needed prescription refills and I wasn't aware that I'm on 0 refills on all my scripts I need. I need my Serjio Avro Satin renewed by lisinopril renewed and my met morphin renewed to [facility] in [place], [place] which with phone numbers on file. Also, I have an appointment at Audiology for 4/17 to get my hearing aids looked at to get a check up and all that but they said I need a work. I need an order from [place], so could you please send that over or do I have to speak to somebody else [phone]? Thank you.

## 2024-03-25 ENCOUNTER — TELEPHONE (OUTPATIENT)
Age: 64
End: 2024-03-25

## 2024-04-02 ENCOUNTER — TELEPHONE (OUTPATIENT)
Age: 64
End: 2024-04-02

## 2024-04-02 NOTE — TELEPHONE ENCOUNTER
Message left on Clinical Line-     Hi, I'm Morteza with the pH. Ara T as in [place] 1960 is my birth date. Several things. I had blood work done quite some time ago and I haven't received any results and there's nothing on my charts about it. From my physical two I called in. I made an appointment at the Audiology over around the corner at [place] [address] and they requested I guess I need a referral from you to get in with the insurance and I called in for this and I was told it would be taken care of on the appointments coming up in a couple of weeks. And it's been several weeks now since I made the call and they haven't received anything yet. So can the referral get over there please? For me so I can. I had to take the day off for this test and I'm not wasting a day off. I don't want to go there and find out that they don't have anything there for me. My number is [phone]. Thank you test results and referral. Thank you.  You received a voice mail from [employer] [facility].

## 2024-04-03 DIAGNOSIS — I10 ESSENTIAL HYPERTENSION: ICD-10-CM

## 2024-04-03 DIAGNOSIS — H93.19 TINNITUS, UNSPECIFIED LATERALITY: Primary | ICD-10-CM

## 2024-04-03 RX ORDER — ATORVASTATIN CALCIUM 20 MG/1
20 TABLET, FILM COATED ORAL DAILY
Qty: 90 TABLET | Refills: 0 | Status: SHIPPED | OUTPATIENT
Start: 2024-04-03

## 2024-04-03 NOTE — TELEPHONE ENCOUNTER
AMBROSIO on clinical line:      Does anybody work here? My name is Morteza Bullock with the PHTOHLMAN 1960. I've spoken with people, I've been leaving messages. Nobody gets back to me. I need a referral sent over to Audiology over there in the [address] [place] [address] for my hearing test coming up in a couple of weeks. I've been asking and asking for it and leaving messages. Nobody could give me the courtesy of a phone call back. I'm sure there's somebody that works here. So on top of that, I've had blood work done. I haven't received any results back yet. Are they in yet? There's nothing on my chart or anything else. In the good old days, when things were normal, you'd have the results back within the next day after they arrived. It was So do I get them this month? Do I get them this year? Do I have to go in for another physical and get another set of blood work so I can get last year's blood work? I mean, how does this work? Could somebody please guide me through this mess [phone]? Thank you. Have a great day.  You received a voice mail from SUKH Boyle- can you order the referral for audiology for his hearing test and review if his lab results are back please?

## 2024-04-03 NOTE — TELEPHONE ENCOUNTER
They are under media because we received them in the mail so I scanned them into the encounter that I created.     Dr. Whalen- can you review please?

## 2024-04-03 NOTE — TELEPHONE ENCOUNTER
Thank you! I am not sure if pt is referring to the lab results I sent you the other day, can you review the encounter from 3/2, and give the pt a call please.

## 2024-04-17 ENCOUNTER — OFFICE VISIT (OUTPATIENT)
Dept: AUDIOLOGY | Facility: CLINIC | Age: 64
End: 2024-04-17
Payer: COMMERCIAL

## 2024-04-17 DIAGNOSIS — H90.A21 SENSORINEURAL HEARING LOSS (SNHL) OF RIGHT EAR WITH RESTRICTED HEARING OF LEFT EAR: ICD-10-CM

## 2024-04-17 DIAGNOSIS — H93.19 TINNITUS, UNSPECIFIED LATERALITY: Primary | ICD-10-CM

## 2024-04-17 DIAGNOSIS — H90.72 MIXED CONDUCTIVE AND SENSORINEURAL HEARING LOSS OF LEFT EAR WITH UNRESTRICTED HEARING OF RIGHT EAR: ICD-10-CM

## 2024-04-17 PROCEDURE — 92567 TYMPANOMETRY: CPT | Performed by: AUDIOLOGIST

## 2024-04-17 PROCEDURE — 92557 COMPREHENSIVE HEARING TEST: CPT | Performed by: AUDIOLOGIST

## 2024-04-17 NOTE — PROGRESS NOTES
Diagnostic Hearing Evaluation    Name:  Nabil Andino  :  1960  Age:  63 y.o.   MRN:  [id number]  Date of Evaluation: 24     HISTORY:     Reason for visit: Known Hearing Loss binaurally    Nabil Andino is being seen today at the request of Dr. Boyle for an initial evaluation of hearing. He was last seen at [facility] on 2020 where an audiogram demonstrated mild to mod-sev SNHL AD, mod to sev MHL AS. Patient reported that he felt his hearing was worse and that he was not doing well with the hearing aids. Patient denies otalgia and otorrhea. He wears phonak m30 RICs    EVALUATION:    Otoscopic Evaluation:   Right Ear: Unremarkable, canal clear   Left Ear: Unremarkable, canal clear    Tympanometry:   Right Ear: Type A; normal middle ear pressure and static compliance    Left Ear: Type A; normal middle ear pressure and static compliance     Speech Audiometry:  Speech Reception (SRT)    Right Ear: 50 dB HL    Left Ear: 65 dB HL    Word Recognition Scores (WRS):  Right Ear: excellent (100 % correct)     Left Ear: excellent (100 % correct)    Stimuli: NU-6    Pure Tone Audiometry:  Conventional pure tone audiometry from 250 - 8000 Hz  was obtained with good reliability and revealed the following:     Right Ear: Mild sloping to moderately severe sensorineural hearing loss (SNHL)    Left Ear: Moderate sloping to severe mixed hearing loss (MHL)     *see attached audiogram    IMPRESSIONS:   Bilateral hearing loss; mod to mod-sev SNHL AD, mod to sev MHL AS.     RECOMMENDATIONS:  Consult ENT for mixed hearing loss (pt lost to follow-up w/ Dr. Rogel); continued hearing aid usage- patient referred back to [facility] audiology, he wished to continue with [facility] due to proximity to his home.     PATIENT EDUCATION:   The results of today's results and recommendations were reviewed with the patient and his hearing thresholds were explained at length. Treatment options, including amplification and communication  strategies, were discussed as appropriate. The patient voiced understanding of his test results. Questions were addressed and the patient was encouraged to contact our department should concerns arise.    [employer] customer service contacted. Device SN: [id number] & [id number] fell out of warranty 12/1/23      Gilberto Leavitt.  Clinical Audiologist  [facility] AUDIOLOGY  [address] [address]

## 2024-04-30 ENCOUNTER — TELEPHONE (OUTPATIENT)
Age: 64
End: 2024-04-30

## 2024-05-01 ENCOUNTER — OFFICE VISIT (OUTPATIENT)
Dept: AUDIOLOGY | Facility: CLINIC | Age: 64
End: 2024-05-01
Payer: COMMERCIAL

## 2024-05-01 DIAGNOSIS — H90.3 SENSORINEURAL HEARING LOSS (SNHL), BILATERAL: Primary | ICD-10-CM

## 2024-05-01 NOTE — PROGRESS NOTES
Hearing Aid Visit:    Name:  Nabil Andino  :  1960  Age:  63 y.o.  MRN:  [id number]  Date of Evaluation: 24     HISTORY:    Nabil Andino was seen today (2024) for a(n) out-of-warranty hearing aid check of his bilateral hearing aids. Today, Nabil stated that he would like to have the hearing aids reprogrammed to his most recent hearing test.    DEVICE INFORMATION:      Make Phonak Color Champagne   Model Audeo M30R Dome Size Medium vented   Right SN [id number]  Size 1M - left  1S - right   Left SN [id number] Earmold NA   Warranty Exp. 2023 Battery Rechargeable          ACTION/ADJUSTMENTS:    -Device form factor cleaned. New wax guards and domes affixed  -Hearing aid(s) run through [employer] drying system. 1.2 uL removed during processing.  -Audiogram programmed into aids.   -Feedback manager re-performed. Left med occluded dome was near feedback margin. This was increased to large occluded dome, which improved headroom considerably.  -Real Ear Measurements (REM) were performed bilaterally using NAL-NL2 prescriptive targets. Targets were approximated for soft, medium, and loud speech at 55, 65, 75 decibels sound pressure level (SPL), respectively. Maximum Power Output (MPO) was within the predicted range of comfort. Subjective listening comfort validated by Mr. Andino.       RECOMMENDATIONS:     Mr. Andino will trial today's settings and will RTC in approx 1 mo when seeing ENT. If issues/concerns arise he is welcome to follow-up ahead of time. He was also given OVR contact info to consider new aids. This would need to be performed at [facility].       Gilberto Leavitt.,   Clinical Audiologist  [facility]  [facility] AUDIOLOGY  [address] [address]

## 2024-05-09 ENCOUNTER — ANESTHESIA (OUTPATIENT)
Dept: ANESTHESIOLOGY | Facility: HOSPITAL | Age: 64
End: 2024-05-09

## 2024-05-09 ENCOUNTER — ANESTHESIA EVENT (OUTPATIENT)
Dept: ANESTHESIOLOGY | Facility: HOSPITAL | Age: 64
End: 2024-05-09

## 2024-05-24 ENCOUNTER — ANESTHESIA EVENT (OUTPATIENT)
Dept: GASTROENTEROLOGY | Facility: AMBULARY SURGERY CENTER | Age: 64
End: 2024-05-24

## 2024-05-24 ENCOUNTER — ANESTHESIA (OUTPATIENT)
Dept: GASTROENTEROLOGY | Facility: AMBULARY SURGERY CENTER | Age: 64
End: 2024-05-24

## 2024-05-24 ENCOUNTER — HOSPITAL ENCOUNTER (OUTPATIENT)
Dept: GASTROENTEROLOGY | Facility: AMBULARY SURGERY CENTER | Age: 64
Setting detail: OUTPATIENT SURGERY
Discharge: HOME | End: 2024-05-24
Attending: INTERNAL MEDICINE | Admitting: INTERNAL MEDICINE
Payer: COMMERCIAL

## 2024-05-24 VITALS
OXYGEN SATURATION: 100 % | SYSTOLIC BLOOD PRESSURE: 136 MMHG | HEART RATE: 66 BPM | RESPIRATION RATE: 18 BRPM | DIASTOLIC BLOOD PRESSURE: 75 MMHG | TEMPERATURE: 97 F

## 2024-05-24 DIAGNOSIS — Z12.11 SCREENING FOR COLON CANCER: ICD-10-CM

## 2024-05-24 LAB — GLUCOSE SERPL-MCNC: 103 MG/DL (ref 65–140)

## 2024-05-24 PROCEDURE — 82948 REAGENT STRIP/BLOOD GLUCOSE: CPT

## 2024-05-24 PROCEDURE — G0121 COLON CA SCRN NOT HI RSK IND: HCPCS | Performed by: INTERNAL MEDICINE

## 2024-05-24 RX ORDER — LIDOCAINE HYDROCHLORIDE 20 MG/ML
INJECTION, SOLUTION EPIDURAL; INFILTRATION; INTRACAUDAL; PERINEURAL AS NEEDED
Status: DISCONTINUED | OUTPATIENT
Start: 2024-05-24 | End: 2024-05-24

## 2024-05-24 RX ORDER — PROPOFOL 10 MG/ML
INJECTION, EMULSION INTRAVENOUS CONTINUOUS PRN
Status: DISCONTINUED | OUTPATIENT
Start: 2024-05-24 | End: 2024-05-24

## 2024-05-24 RX ORDER — PROPOFOL 10 MG/ML
INJECTION, EMULSION INTRAVENOUS AS NEEDED
Status: DISCONTINUED | OUTPATIENT
Start: 2024-05-24 | End: 2024-05-24

## 2024-05-24 RX ORDER — SODIUM CHLORIDE, SODIUM LACTATE, POTASSIUM CHLORIDE, CALCIUM CHLORIDE 600; 310; 30; 20 MG/100ML; MG/100ML; MG/100ML; MG/100ML
125 INJECTION, SOLUTION INTRAVENOUS CONTINUOUS
Status: DISCONTINUED | OUTPATIENT
Start: 2024-05-24 | End: 2024-05-28 | Stop reason: HOSPADM

## 2024-05-24 RX ORDER — SODIUM CHLORIDE, SODIUM LACTATE, POTASSIUM CHLORIDE, CALCIUM CHLORIDE 600; 310; 30; 20 MG/100ML; MG/100ML; MG/100ML; MG/100ML
INJECTION, SOLUTION INTRAVENOUS CONTINUOUS PRN
Status: DISCONTINUED | OUTPATIENT
Start: 2024-05-24 | End: 2024-05-24

## 2024-05-24 RX ADMIN — PROPOFOL 100 MCG/KG/MIN: 10 INJECTION, EMULSION INTRAVENOUS at 10:25

## 2024-05-24 RX ADMIN — SODIUM CHLORIDE, SODIUM LACTATE, POTASSIUM CHLORIDE, AND CALCIUM CHLORIDE: .6; .31; .03; .02 INJECTION, SOLUTION INTRAVENOUS at 10:18

## 2024-05-24 RX ADMIN — LIDOCAINE HYDROCHLORIDE 50 MG: 20 INJECTION, SOLUTION EPIDURAL; INFILTRATION; INTRACAUDAL; PERINEURAL at 10:24

## 2024-05-24 RX ADMIN — PROPOFOL 100 MG: 10 INJECTION, EMULSION INTRAVENOUS at 10:24

## 2024-05-24 RX ADMIN — SODIUM CHLORIDE, SODIUM LACTATE, POTASSIUM CHLORIDE, AND CALCIUM CHLORIDE 125 ML/HR: .6; .31; .03; .02 INJECTION, SOLUTION INTRAVENOUS at 09:49

## 2024-05-24 NOTE — ANESTHESIA POSTPROCEDURE EVALUATION
Post-Op Assessment Note    CV Status:  Stable  Pain Score: 0    Pain management: adequate       Mental Status:  Sleepy   Hydration Status:  Stable   PONV Controlled:  None   Airway Patency:  Patent  Two or more mitigation strategies used for obstructive sleep apnea   Post Op Vitals Reviewed: Yes    No anethesia notable event occurred.    Staff: CRNA               BP   173/93   Temp 97   Pulse 75   Resp 16   SpO2 99

## 2024-05-24 NOTE — H&P
History and Physical -  [facility]  Nabil Andino 63 y.o. male MRN: [id number]    HPI: Nabil Andino is a 63 y.o. year old male who presents for colon cancer screening.      Review of Systems    Historical Information   Past Medical History:   Diagnosis Date    Hypertension      Past Surgical History:   Procedure Laterality Date    ANKLE FRACTURE SURGERY Right     orif    ANKLE HARDWARE REMOVAL Right     BACK SURGERY      laminectomy l 5    BICEPS TENDON REPAIR Right      Social History   Social History     Substance and Sexual Activity   Alcohol Use Yes     Social History     Substance and Sexual Activity   Drug Use Never     Social History     Tobacco Use   Smoking Status Never   Smokeless Tobacco Never     No family history on file.    Meds/Allergies     Not in a hospital admission.    Allergies   Allergen Reactions    Other      seasonal       Objective     /81   Pulse 63   Temp (!) 97 °F (36.1 °C) (Temporal)   Resp 18   SpO2 98%       PHYSICAL EXAM    Gen: NAD  CV: RRR  CHEST: Clear  ABD: soft, NT/ND  EXT: no edema  Neuro: AAO      ASSESSMENT/PLAN:  This is a 63 y.o. year old male here for colon cancer screening.  Patient was explained about the risks and benefits of the procedure. Risks including but not limited to bleeding, infection, perforation were explained in detail.     PLAN:   Procedure: Colonoscopy.

## 2024-05-24 NOTE — ANESTHESIA PREPROCEDURE EVALUATION
Procedure:  COLONOSCOPY    Relevant Problems   CARDIO   (+) Combined hyperlipidemia associated with type 2 diabetes mellitus  (HCC)   (+) Essential hypertension   (+) Mixed hyperlipidemia      MUSCULOSKELETAL   (+) Gout      Possible sleep apnea  S/p UPPP 30 years ago  Recommended he f/u with PCP for sleep study  Physical Exam    Airway    Mallampati score: II  TM Distance: >3 FB  Neck ROM: full     Dental   Comment: Denies loose teeth     Cardiovascular  Cardiovascular exam normal    Pulmonary  Pulmonary exam normal     Other Findings  Portions of exam deferred due to low yield and/or unknown COVID status      Anesthesia Plan  ASA Score- 2     Anesthesia Type- IV sedation with anesthesia with ASA Monitors.         Additional Monitors:     Airway Plan:            Plan Factors-Exercise tolerance (METS): >4 METS.    Chart reviewed.   Existing labs reviewed. Patient summary reviewed.    Patient is not a current smoker.              Induction- intravenous.    Postoperative Plan-         Informed Consent- Anesthetic plan and risks discussed with patient.  I personally reviewed this patient with the CRNA. Discussed and agreed on the Anesthesia Plan with the CRNA..

## 2024-05-31 ENCOUNTER — RA CDI HCC (OUTPATIENT)
Dept: OTHER | Facility: HOSPITAL | Age: 64
End: 2024-05-31

## 2024-05-31 NOTE — PROGRESS NOTES
HCC coding opportunities       Chart reviewed, no opportunity found: CHART REVIEWED, NO OPPORTUNITY FOUND        Patients Insurance        Commercial Insurance: [employer]

## 2024-06-05 ENCOUNTER — OFFICE VISIT (OUTPATIENT)
Dept: OTOLARYNGOLOGY | Facility: CLINIC | Age: 64
End: 2024-06-05
Payer: COMMERCIAL

## 2024-06-05 VITALS — HEIGHT: 68 IN | WEIGHT: 185 LBS | BODY MASS INDEX: 28.04 KG/M2

## 2024-06-05 DIAGNOSIS — H93.13 BILATERAL TINNITUS: ICD-10-CM

## 2024-06-05 DIAGNOSIS — H61.23 BILATERAL IMPACTED CERUMEN: ICD-10-CM

## 2024-06-05 DIAGNOSIS — H90.3 SENSORINEURAL HEARING LOSS (SNHL), BILATERAL: Primary | ICD-10-CM

## 2024-06-05 PROCEDURE — 69210 REMOVE IMPACTED EAR WAX UNI: CPT | Performed by: NURSE PRACTITIONER

## 2024-06-05 PROCEDURE — 99204 OFFICE O/P NEW MOD 45 MIN: CPT | Performed by: NURSE PRACTITIONER

## 2024-06-05 NOTE — PROGRESS NOTES
Ambulatory Visit  Name: Nabil Andino      : 1960      MRN: [id number]  Encounter Provider: LEW Davis  Encounter Date: 2024   Encounter department: [facility] [place]    Assessment & Plan   1. Sensorineural hearing loss (SNHL), bilateral  2. Bilateral tinnitus  3. Bilateral impacted cerumen  -     Ear cerumen removal    On exam noted bilateral cerumen impaction and unable to fully view tympanic membrane.  Left more than right. Cerumen impaction removed bilateral eac with alligator forceps and suction, pt tolerated procedure well.  Upon removal, improved hearing and decreased clogged sensation of bilateral ears.  Discussed routine cerumen care including avoidance of q-tips, may use cerumen softeners every one to two months.  Hydrocortisone cream pea sized amount on finger as needed for itching in ears.  Encourage ongoing follow up annually to monitor for cerumen and hearing.      Audiogram completed 2024 reviewed and interpreted and indicated right SNHL moderate to severe, left mixed hearing loss - conductive in low frequency with asymmetry then meeting with right ear in higher frequencies for SNHL.   Word discrimination 100% bilaterally. Tymps Type A bilaterally    Compared to prior testing 2020 and overall hearing is stable and not changed.    Reviewed prior ENT care with Dr Rogel    Reviewed causes of asymmetrical SNHL in gradual nature including noise exposure, hereditary, autoimmune, otosclerosis, Lyme, viral, inflammatory process, vs acoustic neuroma.  Reviewed treatment options including lab studies, and imaging Ct temporal bones vs MRI brain with IAC. After discussion, pt declined imaging at this time.     Typical causes of SNHL include maturity changes, noise exposure, and hereditary risk factors.   Offered options for bilateral SNHL including acceptance, lifestyle changes such as moving closer to those who are speaking, or hearing amplification.  He would qualify for  hearing amplification based on audiogram.  Discussed hearing aid options including facilities to obtain a hearing aid from as well as costs and benefits.  Discussed that quality of life may improve with hearing amplification and he agreed to continue with current hearing aids.    Tinnitus and possible causes including medications, viral illness, inner ear disorder, TMJ syndrome, or hearing changes.   Options for bilateral tinnitus including adding background noise, tinnitus retraining therapy, masking device, Resound tinnitus yandel, Acupuncture, or acceptance.  Limit caffeine and ibuprofen intake. Discouraged q-tip use due to damage of ear canal.  Consider Flonase daily for eustachian tube dysfunction.  No specific medications or surgery indicated for treatment of tinnitus.  Consider MRI imaging if worsens       History of Present Illness     Nabil Andino is a 63 y.o. male who presents today as a new patient due to ear concerns.  Hearing gradually worsening.  Bilateral ears feel blocked.  Ringing tinnitus since childhood.  No otalgia or otorrhea.  No history of ear surgery.  Current hearing aids, about 4 years old.    Prior ENT visit with Dr Rogel in 2020. Tinnitus for many years. Left ear he has had gradual decrease in hearing over time. History of many concussions over time. He shoots guns recreationally. He states that he does wear earplugs and that he is a right-handed gun shooter. 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 Ear infections as child, approx one per year.       Review of Systems   Constitutional: Negative.    HENT:  Positive for hearing loss and tinnitus. Negative for congestion, ear discharge, ear pain, nosebleeds, postnasal drip, rhinorrhea, sinus pressure, sinus pain, sore throat and voice change.    Respiratory:  Negative for chest tightness and shortness of breath.    Skin:  Negative for color change.  "  Neurological:  Negative for dizziness, numbness and headaches.   Psychiatric/Behavioral: Negative.         Objective     Ht 5' 8\" (1.727 m)   Wt 83.9 kg (185 lb)   BMI 28.13 kg/m²     Physical Exam  Vitals and nursing note reviewed.   Constitutional:       General: He is not in acute distress.     Appearance: He is well-developed.   HENT:      Head: Normocephalic and atraumatic.      Right Ear: Tympanic membrane, ear canal and external ear normal. There is impacted cerumen.      Left Ear: Tympanic membrane, ear canal and external ear normal. There is impacted cerumen.      Nose: Nose normal.      Mouth/Throat:      Mouth: Mucous membranes are moist.   Pulmonary:      Effort: Pulmonary effort is normal.   Musculoskeletal:      Cervical back: Neck supple.   Skin:     General: Skin is warm and dry.      Capillary Refill: Capillary refill takes less than 2 seconds.   Neurological:      Mental Status: He is alert.   Psychiatric:         Mood and Affect: Mood normal.       Administrative Statements       Ear cerumen removal    Date/Time: 6/5/2024 4:00 PM    Performed by: LEW Daivs  Authorized by: LEW Davis  Universal Protocol:  Consent: Verbal consent obtained.  Risks and benefits: risks, benefits and alternatives were discussed  Consent given by: patient  Patient understanding: patient states understanding of the procedure being performed    Patient location:  Clinic  Procedure details:     Local anesthetic:  None    Location:  L ear and R ear    Approach:  External  Post-procedure details:     Complication:  None    Hearing quality:  Normal    Patient tolerance of procedure:  Tolerated well, no immediate complications        "

## 2024-06-05 NOTE — PATIENT INSTRUCTIONS
Tinnitus and possible causes including medications, viral illness, inner ear disorder, or hearing changes.   Options for bilateral tinnitus including adding background noise, tinnitus retraining therapy, masking device, Resound tinnitus yandel, Acupuncture, or acceptance.  Limit caffeine and ibuprofen intake. Lipoflavinoid vitamins daily  Discouraged q-tip use due to damage of ear canal.  Consider Flonase daily for eustachian tube dysfunction one spray each nostril twice per day.  No specific medications or surgery indicated for treatment of tinnitus.    Consider MRI imaging if worsens

## 2024-06-24 ENCOUNTER — OFFICE VISIT (OUTPATIENT)
Age: 64
End: 2024-06-24

## 2024-06-24 VITALS
HEIGHT: 68 IN | BODY MASS INDEX: 28.79 KG/M2 | WEIGHT: 190 LBS | RESPIRATION RATE: 16 BRPM | DIASTOLIC BLOOD PRESSURE: 80 MMHG | TEMPERATURE: 98 F | HEART RATE: 74 BPM | OXYGEN SATURATION: 99 % | SYSTOLIC BLOOD PRESSURE: 125 MMHG

## 2024-06-24 DIAGNOSIS — I10 ESSENTIAL HYPERTENSION: ICD-10-CM

## 2024-06-24 DIAGNOSIS — M10.9 GOUT, UNSPECIFIED CAUSE, UNSPECIFIED CHRONICITY, UNSPECIFIED SITE: ICD-10-CM

## 2024-06-24 DIAGNOSIS — E78.2 MIXED HYPERLIPIDEMIA: ICD-10-CM

## 2024-06-24 DIAGNOSIS — E11.65 TYPE 2 DIABETES MELLITUS WITH HYPERGLYCEMIA, WITHOUT LONG-TERM CURRENT USE OF INSULIN (HCC): Primary | ICD-10-CM

## 2024-06-24 LAB
LEFT EYE DIABETIC RETINOPATHY: NORMAL
LEFT EYE IMAGE QUALITY: NORMAL
LEFT EYE MACULAR EDEMA: NORMAL
LEFT EYE OTHER RETINOPATHY: NORMAL
RIGHT EYE DIABETIC RETINOPATHY: NORMAL
RIGHT EYE IMAGE QUALITY: NORMAL
RIGHT EYE MACULAR EDEMA: NORMAL
RIGHT EYE OTHER RETINOPATHY: NORMAL
SEVERITY (EYE EXAM): NORMAL
SL AMB POCT HEMOGLOBIN AIC: 5.8 (ref ?–6.5)

## 2024-06-24 PROCEDURE — 99214 OFFICE O/P EST MOD 30 MIN: CPT | Performed by: FAMILY MEDICINE

## 2024-06-24 PROCEDURE — 83036 HEMOGLOBIN GLYCOSYLATED A1C: CPT | Performed by: FAMILY MEDICINE

## 2024-06-24 NOTE — PROGRESS NOTES
Ambulatory Visit  Name: Nabil Andino      : 1960      MRN: [id number]  Encounter Provider: Jesika Giron MD  Encounter Date: 2024   Encounter department: [facility]    Assessment & Plan   1. Type 2 diabetes mellitus with hyperglycemia, without long-term current use of insulin (HCC)  Assessment & Plan:    Lab Results   Component Value Date    HGBA1C 5.8 2024     Stable and at goal. Improved from previous HbA1c 6.0. Uses home metformin. Also on ACEi and Statin.  -Continue metformin 1000 mg daily with breakfast  -Continue atorvastatin 20 mg daily  -Continue lisinopril 10 mg daily  -Nutrition and exercise counseling  -Lab work ordered.  Repeat hemoglobin A1c in 6 months.  If patient continues to be well-controlled can consider decreasing metformin  -Foot exam and diabetic eye exam completed today  -Follow-up in 6 months.  Advised to complete lab work prior  Orders:  -     IRIS Diabetic eye exam  -     POCT hemoglobin A1c  -     Hepatitis C Ab W/Refl To HCV RNA, Qn, PCR; Future  -     Albumin / creatinine urine ratio; Future  -     Albumin / creatinine urine ratio  -     Hemoglobin A1C; Future; Expected date: 2024  -     Hemoglobin A1C  2. Essential hypertension  Assessment & Plan:  /80 today controlled. Continue lisinopril 10 mg daily     3. Gout, unspecified cause, unspecified chronicity, unspecified site  Assessment & Plan:  Pt reports no recent flares. Takes good care of feet  4. Mixed hyperlipidemia  Assessment & Plan:  Stable. Continue atorvastatin 20 mg daily       History of Present Illness     HPI  64 yo male presenting for f/u DM and HTN.     Pt expresses frustration that he never got called regarding results of recent labwork though he tried to contact the office multiple times. Went to lab in [place]. Thinks it was [employer] or [employer] [facility]. On chart review seems on 4/15/2024 Dr. Boyle tried to contact pt and left voicemail regarding results,  was reviewed though not in usual place on electronic chart (not under labs tab), reports results mostly favorable. Most notable finding slighly elevated A1c suggesting prediabetes. Pt reports he might have gotten a voicemail that he said didn't say much of anything and was hard to make out. In any case routine diabetic screenings were completed today.   Pt is taking metformin, lisinopril, and statin.   Previously on jardiance. Lost a lot of weight. Now gained a bit of weight back but feels better, did not feel well at 160 lbs. Cut carbs and soda out.     Pt reports getting vaccines at pharmacy [facility].  Zoster/shingles, pneumonia shot, and RSV.  Could not get results on phone. Will request vaccination records from pharmacy and bring to next visit.     Review of Systems   Constitutional:  Negative for chills and fever.   HENT:  Negative for ear pain and sore throat.    Eyes:  Negative for pain and visual disturbance.   Respiratory:  Negative for cough and shortness of breath.    Cardiovascular:  Negative for chest pain and palpitations.   Gastrointestinal:  Negative for abdominal pain and vomiting.   Genitourinary:  Negative for dysuria and hematuria.   Musculoskeletal:  Negative for arthralgias and back pain.   Skin:  Negative for color change and rash.   Neurological:  Negative for seizures and syncope.   All other systems reviewed and are negative.    Medical History Reviewed by provider this encounter:       Past Medical History   Past Medical History:   Diagnosis Date    Hypertension      Past Surgical History:   Procedure Laterality Date    ANKLE FRACTURE SURGERY Right     orif    ANKLE HARDWARE REMOVAL Right     BACK SURGERY      laminectomy l 5    BICEPS TENDON REPAIR Right      History reviewed. No pertinent family history.  Current Outpatient Medications on File Prior to Visit   Medication Sig Dispense Refill    aspirin (ECOTRIN LOW STRENGTH) 81 mg EC tablet Take 81 mg by mouth daily      atorvastatin (LIPITOR)  "20 mg tablet Take 1 tablet (20 mg total) by mouth daily 90 tablet 0    cetirizine (ZyrTEC) 10 mg tablet Take 10 mg by mouth daily      fluticasone (FLONASE) 50 mcg/act nasal spray 2 sprays into each nostril daily      lisinopril (ZESTRIL) 10 mg tablet Take 1 tablet (10 mg total) by mouth daily 90 tablet 3    metFORMIN (GLUCOPHAGE) 1000 MG tablet Take 1 tablet (1,000 mg total) by mouth daily with breakfast 90 tablet 3    tamsulosin (FLOMAX) 0.4 mg Take 1 capsule (0.4 mg total) by mouth 2 (two) times daily after meals 180 capsule 2     No current facility-administered medications on file prior to visit.     Allergies   Allergen Reactions    Other      seasonal      Current Outpatient Medications on File Prior to Visit   Medication Sig Dispense Refill    aspirin (ECOTRIN LOW STRENGTH) 81 mg EC tablet Take 81 mg by mouth daily      atorvastatin (LIPITOR) 20 mg tablet Take 1 tablet (20 mg total) by mouth daily 90 tablet 0    cetirizine (ZyrTEC) 10 mg tablet Take 10 mg by mouth daily      fluticasone (FLONASE) 50 mcg/act nasal spray 2 sprays into each nostril daily      lisinopril (ZESTRIL) 10 mg tablet Take 1 tablet (10 mg total) by mouth daily 90 tablet 3    metFORMIN (GLUCOPHAGE) 1000 MG tablet Take 1 tablet (1,000 mg total) by mouth daily with breakfast 90 tablet 3    tamsulosin (FLOMAX) 0.4 mg Take 1 capsule (0.4 mg total) by mouth 2 (two) times daily after meals 180 capsule 2     No current facility-administered medications on file prior to visit.      Social History     Tobacco Use    Smoking status: Never    Smokeless tobacco: Never   Vaping Use    Vaping status: Never Used   Substance and Sexual Activity    Alcohol use: Yes    Drug use: Never    Sexual activity: Not on file     Objective     /80 (BP Location: Right arm, Patient Position: Sitting, Cuff Size: Adult)   Pulse 74   Temp 98 °F (36.7 °C) (Tympanic)   Resp 16   Ht 5' 8\" (1.727 m)   Wt 86.2 kg (190 lb)   SpO2 99%   BMI 28.89 kg/m² "     Physical Exam  Vitals reviewed.   Constitutional:       General: He is not in acute distress.     Appearance: Normal appearance. He is well-developed.   HENT:      Head: Normocephalic and atraumatic.      Right Ear: External ear normal.      Left Ear: External ear normal.      Nose: Nose normal.      Mouth/Throat:      Mouth: Mucous membranes are moist.      Pharynx: Oropharynx is clear.   Eyes:      Extraocular Movements: Extraocular movements intact.      Conjunctiva/sclera: Conjunctivae normal.   Cardiovascular:      Rate and Rhythm: Normal rate and regular rhythm.      Pulses: Normal pulses. no weak pulses.           Dorsalis pedis pulses are 2+ on the right side and 2+ on the left side.      Heart sounds: Normal heart sounds. No murmur heard.  Pulmonary:      Effort: Pulmonary effort is normal. No respiratory distress.      Breath sounds: Normal breath sounds. No wheezing or rales.   Abdominal:      General: Bowel sounds are normal.      Palpations: Abdomen is soft.      Tenderness: There is no abdominal tenderness.   Musculoskeletal:         General: No swelling.      Cervical back: Neck supple.      Comments: R ankle chronic skin and swelling from previous surgery compared to L ankle   Foot exam otherwise normal   Feet:      Right foot:      Skin integrity: No ulcer, skin breakdown, erythema, warmth, callus or dry skin.      Left foot:      Skin integrity: No ulcer, skin breakdown, erythema, warmth, callus or dry skin.   Skin:     General: Skin is warm and dry.      Capillary Refill: Capillary refill takes less than 2 seconds.      Findings: No rash.   Neurological:      General: No focal deficit present.      Mental Status: He is alert. Mental status is at baseline.   Psychiatric:         Mood and Affect: Mood normal.         Behavior: Behavior normal.         Diabetic Foot Exam    Patient's shoes and socks removed.    Right Foot/Ankle   Right Foot Inspection  Skin Exam: skin normal and skin intact. No  dry skin, no warmth, no callus, no erythema, no maceration, no abnormal color, no pre-ulcer, no ulcer and no callus.     Toe Exam: ROM and strength within normal limits.     Sensory   Monofilament testing: intact    Vascular  Capillary refills: < 3 seconds  The right DP pulse is 2+.     Left Foot/Ankle  Left Foot Inspection  Skin Exam: skin normal and skin intact. No dry skin, no warmth, no erythema, no maceration, normal color, no pre-ulcer, no ulcer and no callus.     Toe Exam: ROM and strength within normal limits.     Sensory   Monofilament testing: intact    Vascular  Capillary refills: < 3 seconds  The left DP pulse is 2+.     Assign Risk Category  No deformity present  No loss of protective sensation  No weak pulses  Risk: 0      Jesika Giron MD  [facility]  Date: 6/24/2024 Time: 5:45 PM

## 2024-06-24 NOTE — ASSESSMENT & PLAN NOTE
Lab Results   Component Value Date    HGBA1C 5.8 06/24/2024     Stable and at goal. Improved from previous HbA1c 6.0. Uses home metformin. Also on ACEi and Statin.  -Continue metformin 1000 mg daily with breakfast  -Continue atorvastatin 20 mg daily  -Continue lisinopril 10 mg daily  -Nutrition and exercise counseling  -Lab work ordered.  Repeat hemoglobin A1c in 6 months.  If patient continues to be well-controlled can consider decreasing metformin  -Foot exam and diabetic eye exam completed today  -Follow-up in 6 months.  Advised to complete lab work prior

## 2024-07-01 DIAGNOSIS — I10 ESSENTIAL HYPERTENSION: ICD-10-CM

## 2024-07-01 RX ORDER — ATORVASTATIN CALCIUM 20 MG/1
20 TABLET, FILM COATED ORAL DAILY
Qty: 90 TABLET | Refills: 0 | Status: SHIPPED | OUTPATIENT
Start: 2024-07-01

## 2024-07-09 ENCOUNTER — TELEPHONE (OUTPATIENT)
Dept: AUDIOLOGY | Facility: CLINIC | Age: 64
End: 2024-07-09

## 2024-07-09 NOTE — TELEPHONE ENCOUNTER
Mr. Andino called regarding a broken right  (1S) on his phonak hearing aid. I will order a new 1S for him and call when it arrives. He was informed of the $100 OOW charge.

## 2024-11-12 ENCOUNTER — OFFICE VISIT (OUTPATIENT)
Dept: AUDIOLOGY | Facility: CLINIC | Age: 64
End: 2024-11-12

## 2024-11-12 DIAGNOSIS — H90.3 SENSORY HEARING LOSS, BILATERAL: Primary | ICD-10-CM

## 2024-11-12 NOTE — PROGRESS NOTES
Progress Note    Name:  Nabil Andino  :  1960  Age:  63 y.o.  MRN:  [id number]  Date of Evaluation: 24     HISTORY:    The patient  dropped off his right hearing aid reporting it will not charge.      DEVICE INFORMATION:      Make Phonak Color Champagne   Model Audeo M30R Dome Size Medium vented   Right SN [id number]  Size 1M - left  1S - right   Left SN [id number] Earmold NA   Warranty Exp. 2023 Battery Rechargeable         ACTION/ADJUSTMENTS:    Verified hearing aid does not charge with stock .    Contacted patient regarding cost of repair as hearing aid is out of warranty. Patient agreed to 295.00 charge. Patient will pay at .    Sent aid to [employer] for repair.    Patient should be contacted for p/u when hearing aid RFF.      Trevor Medina, CCC-A  Clinical Audiologist

## 2024-12-05 DIAGNOSIS — I10 ESSENTIAL HYPERTENSION: ICD-10-CM

## 2024-12-05 RX ORDER — ATORVASTATIN CALCIUM 20 MG/1
20 TABLET, FILM COATED ORAL DAILY
Qty: 90 TABLET | Refills: 0 | Status: SHIPPED | OUTPATIENT
Start: 2024-12-05

## 2024-12-05 NOTE — TELEPHONE ENCOUNTER
VM left on Rx Line   Morteza COTTON as in [place] [phone] is my cell. I'm calling for a refill for Serjio Satin statin for my cholesterol I put into it for my chart. So I just received something from [facility] stating that they can't fill it until February. I have 6 pills. I have like 6 or 10 pills left so I don't know why it can't be filled. Thank you, [phone].    You received a voice mail from [facility].

## 2024-12-16 ENCOUNTER — RA CDI HCC (OUTPATIENT)
Dept: OTHER | Facility: HOSPITAL | Age: 64
End: 2024-12-16

## 2024-12-16 ENCOUNTER — OFFICE VISIT (OUTPATIENT)
Dept: AUDIOLOGY | Facility: CLINIC | Age: 64
End: 2024-12-16
Payer: COMMERCIAL

## 2024-12-16 DIAGNOSIS — H90.3 SENSORY HEARING LOSS, BILATERAL: Primary | ICD-10-CM

## 2024-12-16 NOTE — PROGRESS NOTES
Progress Note    Name:  Nabil Andino  :  1960  Age:  64 y.o.  MRN:  [id number]  Date of Evaluation: 24     HISTORY:    The patient's wife came in to  the patient's right aid from repair.      ACTION/ADJUSTMENTS:    The repaired aid was paired with patient's left aid in the software.  Patient paid 295.00 today.    Gilberto Medina., CCC-A  Clinical Audiologist

## 2024-12-16 NOTE — PROGRESS NOTES
HCC coding opportunities          Chart Reviewed number of suggestions sent to Provider: 1     Patients Insurance        Commercial Insurance: [employer]     E11.69

## 2024-12-23 ENCOUNTER — TELEPHONE (OUTPATIENT)
Age: 64
End: 2024-12-23

## 2025-01-16 ENCOUNTER — RA CDI HCC (OUTPATIENT)
Dept: OTHER | Facility: HOSPITAL | Age: 65
End: 2025-01-16

## 2025-01-16 NOTE — PROGRESS NOTES
HCC coding opportunities       Chart reviewed, no opportunity found: CHART REVIEWED, NO OPPORTUNITY FOUND        Patients Insurance        Commercial Insurance: [employer]

## 2025-01-21 ENCOUNTER — OFFICE VISIT (OUTPATIENT)
Dept: AUDIOLOGY | Facility: CLINIC | Age: 65
End: 2025-01-21

## 2025-01-21 DIAGNOSIS — H90.3 SENSORY HEARING LOSS, BILATERAL: Primary | ICD-10-CM

## 2025-01-21 NOTE — PROGRESS NOTES
Hearing Aid Visit:    Name:  Nabil Andino  :  1960  Age:  64 y.o.  MRN:  [id number]  Date of Evaluation: 25     HISTORY:    Left aid dropped off Thursday for out of warranty check.    DEVICE INFORMATION:        Make Phonak Color Champagne   Model Audeo M30R Dome Size Medium vented   Right SN [id number]  Size 1M - left  1S - right   Left SN [id number] Earmold NA   Warranty Exp. 2023 Battery Rechargeable         Left Device Right Device   Hearing Aid Make: Phonak  Phonak    Hearing Aid Model: Audeo M30R Audeo M30R   Serial Number: [id number]   Repair Warranty Date: 23   Loss/Damage Warranty Status:             Length/Output 1M 1S   Wax System: CeruShield CeruShield   Dome Size/Style: Large Vented Medium Vented   Battery: Lithium-ion Rechargeable Lithium-ion Rechargeable          ACTION/ADJUSTMENTS:    Charged aid - good sound quality. However, patient reports that aid is not holding a charge all day and would like to have it sent to [employer] to go over all and change battery. Patient aware of $295 charge,    Requested matching length 1 5.0  for right aid as 4.0 receivers have been discontinued and Cerudisks will no longer be used. Matching receivers will allow for patient to use same wax filters on both aids (CeruStop).     RECOMMENDATIONS:     Call patient to schedule HAV when aid arrives. We will need to change right  to 5.0 to match this left repaired aid. Will also dispense Cerustops to replace Cerudisk. Patient to pay $295 at .      Anupam Ramirez.  Clinical Audiologist  [facility]  [facility]  [address] [phone]   5.0 to match this left repaired aid. Will also dispense Cerustops to replace Cerudisk. Patient to pay $295 at .      Anupam Ramirez.  Clinical Audiologist  [facility]  [address] [phone]

## 2025-01-23 ENCOUNTER — OFFICE VISIT (OUTPATIENT)
Age: 65
End: 2025-01-23

## 2025-01-23 VITALS
WEIGHT: 207.22 LBS | TEMPERATURE: 97.7 F | SYSTOLIC BLOOD PRESSURE: 139 MMHG | BODY MASS INDEX: 31.4 KG/M2 | OXYGEN SATURATION: 98 % | HEART RATE: 68 BPM | HEIGHT: 68 IN | DIASTOLIC BLOOD PRESSURE: 82 MMHG

## 2025-01-23 DIAGNOSIS — E11.65 TYPE 2 DIABETES MELLITUS WITH HYPERGLYCEMIA, WITHOUT LONG-TERM CURRENT USE OF INSULIN (HCC): Primary | ICD-10-CM

## 2025-01-23 DIAGNOSIS — E11.69 TYPE 2 DIABETES MELLITUS WITH OTHER SPECIFIED COMPLICATION, WITHOUT LONG-TERM CURRENT USE OF INSULIN (HCC): ICD-10-CM

## 2025-01-23 DIAGNOSIS — Z23 ENCOUNTER FOR IMMUNIZATION: ICD-10-CM

## 2025-01-23 DIAGNOSIS — F32.9 MAJOR DEPRESSIVE DISORDER, REMISSION STATUS UNSPECIFIED, UNSPECIFIED WHETHER RECURRENT: ICD-10-CM

## 2025-01-23 DIAGNOSIS — I10 ESSENTIAL HYPERTENSION: ICD-10-CM

## 2025-01-23 LAB — SL AMB POCT HEMOGLOBIN AIC: 6.5 (ref ?–6.5)

## 2025-01-23 PROCEDURE — 90471 IMMUNIZATION ADMIN: CPT | Performed by: FAMILY MEDICINE

## 2025-01-23 PROCEDURE — 99214 OFFICE O/P EST MOD 30 MIN: CPT | Performed by: FAMILY MEDICINE

## 2025-01-23 PROCEDURE — 83036 HEMOGLOBIN GLYCOSYLATED A1C: CPT | Performed by: FAMILY MEDICINE

## 2025-01-23 PROCEDURE — 90662 IIV NO PRSV INCREASED AG IM: CPT | Performed by: FAMILY MEDICINE

## 2025-01-23 NOTE — PATIENT INSTRUCTIONS
"Patient Education     Type 2 diabetes   The Basics   Written by the doctors and editors at [facility]   What is type 2 diabetes? -- This is a disorder that disrupts the way the body uses sugar. It is sometimes called type 2 diabetes mellitus.  All of the cells in the body need sugar to work normally. Sugar gets into the cells with the help of a hormone called insulin. Insulin is made by the pancreas, an organ in the belly. If there is not enough insulin, or if cells in the body don't respond normally to insulin, sugar builds up in the blood. That is what happens to people with diabetes.  There are 2 different types of diabetes:   In type 1 diabetes, the pancreas makes little or no insulin.   In type 2 diabetes, the pancreas still makes some insulin, but the cells in the body stop responding normally. Eventually, the pancreas cannot make enough insulin to keep up.  Having excess body weight or obesity increases a person's risk of developing type 2 diabetes. But people without excess body weight can get diabetes, too.  What are the symptoms of type 2 diabetes? -- Type 2 diabetes usually causes no symptoms. When symptoms do happen, they include:   Needing to urinate often   Intense thirst   Blurry vision  Can diabetes lead to other health problems? -- Yes. Type 2 diabetes might not make you feel sick. But if it is not managed, it can lead to serious problems over time, such as:   Heart attacks   Strokes   Kidney disease   Vision problems (or even blindness)   Pain or loss of feeling in the hands and feet   Needing to have fingers, toes, or other body parts removed (amputated)  How do I know if I have type 2 diabetes? -- Your doctor or nurse can do a blood test. There are 2 tests that can be used for this. Both involve measuring the amount of sugar in your blood, called your \"blood sugar\" or \"blood glucose\":   One of the tests measures your blood sugar at the time the blood sample is taken. This test is done in the " "morning. You can't eat or drink anything except water for at least 8 hours before the test.   The other test shows what your average blood sugar has been for the past 2 to 3 months. This blood test is called \"hemoglobin A1C\" or just \"A1C.\" It can be checked at any time of the day, even if you have recently eaten.  How is type 2 diabetes treated? -- The goals of treatment are to manage your blood sugar and lower the risk of future problems that can happen in people with diabetes.  Treatment might include:   Lifestyle changes - This is an important part of managing diabetes. It includes eating healthy foods and getting plenty of physical activity.   Medicines - There are a few medicines that help lower blood sugar. Some people need to take pills that help the body make more insulin or that help insulin do its job. Others need insulin shots.  Depending on what medicines you take, you might need to check your blood sugar regularly at home. But not everyone with type 2 diabetes needs to do this. Your doctor or nurse will tell you if you should be checking your blood sugar, and when and how to do this.  Sometimes, people with type 2 diabetes also need medicines to help prevent problems caused by the disease. For instance, medicines used to lower blood pressure can reduce the chances of a heart attack or stroke.   General medical care - It's also important to take care of other areas of your health. This includes watching your blood pressure and cholesterol levels. You should also get certain vaccines, such as vaccines to protect against the flu and coronavirus disease 2019 (\"COVID-19\"). Some people also need a vaccine to prevent pneumonia.  Can type 2 diabetes be prevented? -- Yes. To lower your chances of getting type 2 diabetes, the most important thing you can do is eat a healthy diet and get plenty of physical activity. This can help you lose weight if you are overweight. But eating well and being active are also good " for your overall health. Even gentle activity, like walking, has benefits.  If you smoke, quitting can also lower your risk of type 2 diabetes. Quitting smoking can be difficult, but your doctor or nurse can help.  All topics are updated as new evidence becomes available and our peer review process is complete.  This topic retrieved from [employer] on: Apr 24, 2024.  Topic 13978 Version 23.0  Release: 32.3.2 - C32.113  © 2024 UpToDate, Inc. and/or its affiliates. All rights reserved.  Consumer Information Use and Disclaimer   Disclaimer: This generalized information is a limited summary of diagnosis, treatment, and/or medication information. It is not meant to be comprehensive and should be used as a tool to help the user understand and/or assess potential diagnostic and treatment options. It does NOT include all information about conditions, treatments, medications, side effects, or risks that may apply to a specific patient. It is not intended to be medical advice or a substitute for the medical advice, diagnosis, or treatment of a health care provider based on the health care provider's examination and assessment of a patient's specific and unique circumstances. Patients must speak with a health care provider for complete information about their health, medical questions, and treatment options, including any risks or benefits regarding use of medications. This information does not endorse any treatments or medications as safe, effective, or approved for treating a specific patient. UpToDate, Inc. and its affiliates disclaim any warranty or liability relating to this information or the use thereof.The use of this information is governed by the Terms of Use, available at https://www.wolterskluwer.com/en/know/clinical-effectiveness-terms. 2024© UpToDate, Inc. and its affiliates and/or licensors. All rights reserved.  Copyright   © 2024 UpToDate, Inc. and/or its affiliates. All rights reserved.

## 2025-01-23 NOTE — PROGRESS NOTES
Name: Nabil Andino      : 1960      MRN: [id number]  Encounter Provider: Jesika Giron MD  Encounter Date: 2025   Encounter department: [facility]  :  Assessment & Plan  Type 2 diabetes mellitus with hyperglycemia, without long-term current use of insulin (HCC)    Lab Results   Component Value Date    HGBA1C 6.5 2025     Stable and at goal. Slightly worsened from previous. Uses home metformin. Also on ACEi and Statin.  -Continue metformin 1000 mg daily with breakfast  -Continue atorvastatin 20 mg daily  -Continue lisinopril 10 mg daily  -Continue lifestyle management. Pt admits to noncompliance with diet and exercise but will reinitiate. Also with recent weight gain.  -Pt completed labwork 6 months prior with [facility], available under media tab. Completed metabolic panel with eGFR and albumin/cr ratio at that time. Remove from care gaps   -UTD on foot and eye exam   -Follow-up in 3 months for annual physical and repeat HbA1c     Orders:    POCT hemoglobin A1c    Type 2 diabetes mellitus with other specified complication, without long-term current use of insulin ([facility])    Lab Results   Component Value Date    HGBA1C 6.5 2025            Major depressive disorder, remission status unspecified, unspecified whether recurrent  Stable. Pt reports no issues with mood. Not currently on medication.          Essential hypertension  /82 today. Continue lisinopril 10 mg daily        Encounter for immunization    Orders:    influenza vaccine, high-dose, PF 0.5 mL (Fluzone High Dose)           History of Present Illness   HPI  65 yo male presenting for f/u diabetes.     Knows he gained weight. Thinks numbers will be increased. Switched to new job site which is much less active than previous, previous 5-10k steps every day.   Holidays. Drinking some beer lately. Pt will cut beer out, now drinking about 6 on the weekend as opposed to 2-3 previously. Pt says he  "already knows. \"Easy to drop 20 lbs.\" Pt personally thinks he should be around 180 lbs. Was 165 lbs before when initially told about diabetes but didn't like it/ didn't feel healthy. Calls himself a \"Carbaholic.\"    Pt reports back onto another project for job where moving around a lot. Baby steps, gotta get legs back. Just started back on Monday. Exhausted because new activity levels when recently sitting in car for 4 months.   Compliant with medications     Pt believes PCV20 two dose completed at [facility]. 1st dose done at [facility]. 2nd dose done at [facility] here on the corner. Zoster/shingles done at [facility] here at [facility]. He will use [facility] yandel or call pharmacy to determine dates for doses and report back via phone or Team-Matcht so we can put in our system.     Pt had labwork done at [facility] under media including albumin/Cr ratio and eGFR. Message sent to care gap team to address as pt did complete within the past year.     Review of Systems   Constitutional:  Negative for chills and fever.        Knows he gained weight   HENT:  Negative for ear pain and sore throat.    Eyes:  Negative for pain and visual disturbance.   Respiratory:  Negative for cough and shortness of breath.    Cardiovascular:  Negative for chest pain and palpitations.   Gastrointestinal:  Negative for abdominal pain and vomiting.   Genitourinary:  Negative for dysuria and hematuria.   Musculoskeletal:  Negative for arthralgias and back pain.   Skin:  Negative for color change and rash.   Neurological:  Negative for seizures and syncope.   All other systems reviewed and are negative.      Objective   /82 (BP Location: Right arm, Patient Position: Sitting, Cuff Size: Standard)   Pulse 68   Temp 97.7 °F (36.5 °C) (Tympanic)   Ht 5' 8\" (1.727 m)   Wt 94 kg (207 lb 3.5 oz)   SpO2 98%   BMI 31.51 kg/m²      Physical Exam  Vitals reviewed.   Constitutional:       General: He is not in acute distress.     Appearance: Normal appearance. He is " well-developed. He is obese.   HENT:      Head: Normocephalic and atraumatic.      Right Ear: External ear normal.      Left Ear: External ear normal.      Nose: Nose normal.      Mouth/Throat:      Mouth: Mucous membranes are moist.      Pharynx: Oropharynx is clear.   Eyes:      Extraocular Movements: Extraocular movements intact.      Conjunctiva/sclera: Conjunctivae normal.   Cardiovascular:      Rate and Rhythm: Normal rate and regular rhythm.      Pulses: Normal pulses.      Heart sounds: Normal heart sounds. No murmur heard.  Pulmonary:      Effort: Pulmonary effort is normal. No respiratory distress.      Breath sounds: Normal breath sounds. No wheezing or rales.   Abdominal:      Palpations: Abdomen is soft.      Tenderness: There is no abdominal tenderness.   Musculoskeletal:         General: No swelling.      Cervical back: Neck supple.   Skin:     General: Skin is warm and dry.      Capillary Refill: Capillary refill takes less than 2 seconds.      Findings: No rash.   Neurological:      General: No focal deficit present.      Mental Status: He is alert. Mental status is at baseline.   Psychiatric:         Mood and Affect: Mood normal.         Behavior: Behavior normal.           Jesika Giron MD  [facility]  Date: 1/23/2025 Time: 4:41 PM

## 2025-01-23 NOTE — ASSESSMENT & PLAN NOTE
Lab Results   Component Value Date    HGBA1C 6.5 01/23/2025     Stable and at goal. Slightly worsened from previous. Uses home metformin. Also on ACEi and Statin.  -Continue metformin 1000 mg daily with breakfast  -Continue atorvastatin 20 mg daily  -Continue lisinopril 10 mg daily  -Continue lifestyle management. Pt admits to noncompliance with diet and exercise but will reinitiate. Also with recent weight gain.  -Pt completed labwork 6 months prior with [facility], available under media tab. Completed metabolic panel with eGFR and albumin/cr ratio at that time. Remove from care gaps   -UTD on foot and eye exam   -Follow-up in 3 months for annual physical and repeat HbA1c     Orders:    POCT hemoglobin A1c

## 2025-01-24 ENCOUNTER — TELEPHONE (OUTPATIENT)
Dept: ADMINISTRATIVE | Facility: OTHER | Age: 65
End: 2025-01-24

## 2025-01-24 NOTE — TELEPHONE ENCOUNTER
----- Message from Maida BELTRÁN sent at 1/23/2025  4:20 PM EST -----  Regarding: care gap request  01/23/25 4:20 PM    Hello, our patient attached above has had Urine Albumin/Creatinine Ratio and Kidney Health evaluation completed/performed. Please assist in updating the patient chart by pulling the document from the Media Tab.     Thank you,  LIZBETH Sanchez

## 2025-01-27 NOTE — TELEPHONE ENCOUNTER
Upon review of the In Basket request we have found/obtained the documentation. After careful review of the document we are unable to complete this request for Microalbumin Creatinine Urine Ratio OR Albumin Creatinine Urine Ratio  because the documentation does not have the result(s) needed to close the requested care gap(s).     Any additional questions or concerns should be emailed to the Practice Liaisons via the appropriate education email address, please do not reply via In Basket.    Thank you  Brooke Bean MA   [facility]

## 2025-01-28 ENCOUNTER — OFFICE VISIT (OUTPATIENT)
Dept: AUDIOLOGY | Facility: CLINIC | Age: 65
End: 2025-01-28
Payer: COMMERCIAL

## 2025-01-28 ENCOUNTER — PATIENT MESSAGE (OUTPATIENT)
Dept: UROLOGY | Facility: CLINIC | Age: 65
End: 2025-01-28

## 2025-01-28 DIAGNOSIS — H90.3 SENSORY HEARING LOSS, BILATERAL: Primary | ICD-10-CM

## 2025-01-28 NOTE — PROGRESS NOTES
Hearing Aid Visit:    Name:  Nabil Andino  :  1960  Age:  64 y.o.  MRN:  [id number]  Date of Evaluation: 25     HISTORY:    Nabil Andino was seen today (2025) to be fit with repaired left aid.      DEVICE INFORMATION:       Left Device Right Device   Hearing Aid Make: Phonak  Phonak    Hearing Aid Model: Audeo M30R Audeo M30R   Serial Number: [id number]   Repair Warranty Date: 1/24/26 5/15/25   Loss/Damage Warranty Status:              Length/Output 1M  1S   Wax System: StemBioSys   Dome Size/Style: Large Vented Medium Vented   Battery: Lithium-ion Rechargeable Lithium-ion Rechargeable         ACTION/ADJUSTMENTS:    Programmed and fit repaired left aid. Demonstrated use of new wax traps and dispensed traps. Patient's right aid still has 4.0 . Ordered 5.0  for right aid under warranty. Patient reports that he is able to change  himself (previously ordered one online and changed it himself).    Patient paid $295 for repair today.    RECOMMENDATIONS:     Contact patient when replacement right  arrives. Patient can schedule HAV or just pick  up at  if he prefers.      Anupam Ramirez.  Clinical Audiologist  [facility]  [facility]  [address] [id number]

## 2025-02-07 DIAGNOSIS — R39.11 BENIGN PROSTATIC HYPERPLASIA WITH URINARY HESITANCY: ICD-10-CM

## 2025-02-07 DIAGNOSIS — N40.1 BENIGN PROSTATIC HYPERPLASIA WITH URINARY HESITANCY: ICD-10-CM

## 2025-02-11 ENCOUNTER — PATIENT MESSAGE (OUTPATIENT)
Dept: UROLOGY | Facility: CLINIC | Age: 65
End: 2025-02-11

## 2025-02-11 DIAGNOSIS — N40.1 BENIGN PROSTATIC HYPERPLASIA WITH URINARY HESITANCY: ICD-10-CM

## 2025-02-11 DIAGNOSIS — R39.11 BENIGN PROSTATIC HYPERPLASIA WITH URINARY HESITANCY: ICD-10-CM

## 2025-02-11 RX ORDER — TAMSULOSIN HYDROCHLORIDE 0.4 MG/1
0.4 CAPSULE ORAL
Qty: 180 CAPSULE | Refills: 1 | Status: SHIPPED | OUTPATIENT
Start: 2025-02-11 | End: 2026-02-11

## 2025-02-11 RX ORDER — TAMSULOSIN HYDROCHLORIDE 0.4 MG/1
0.4 CAPSULE ORAL
Qty: 90 CAPSULE | Refills: 3 | OUTPATIENT
Start: 2025-02-11

## 2025-03-03 DIAGNOSIS — I10 ESSENTIAL HYPERTENSION: ICD-10-CM

## 2025-03-03 RX ORDER — ATORVASTATIN CALCIUM 20 MG/1
20 TABLET, FILM COATED ORAL DAILY
Qty: 90 TABLET | Refills: 0 | Status: SHIPPED | OUTPATIENT
Start: 2025-03-03

## 2025-03-11 ENCOUNTER — TELEPHONE (OUTPATIENT)
Age: 65
End: 2025-03-11

## 2025-03-11 PROBLEM — F32.9 MAJOR DEPRESSIVE DISORDER, REMISSION STATUS UNSPECIFIED, UNSPECIFIED WHETHER RECURRENT: Status: RESOLVED | Noted: 2025-01-23 | Resolved: 2025-03-11

## 2025-03-11 NOTE — TELEPHONE ENCOUNTER
"Patient is calling in expressing his concern for a Dx in his chart he states \"Must be removed\". (Major Depressive disorder) It looks like this was added to his chart 1/23/25. Patient states he has also submitted request for removal via My Chart. He believes this was added in error, please advise.  "

## 2025-03-12 ENCOUNTER — OFFICE VISIT (OUTPATIENT)
Dept: UROLOGY | Facility: CLINIC | Age: 65
End: 2025-03-12

## 2025-03-12 VITALS
HEIGHT: 68 IN | BODY MASS INDEX: 31.07 KG/M2 | DIASTOLIC BLOOD PRESSURE: 70 MMHG | HEART RATE: 83 BPM | SYSTOLIC BLOOD PRESSURE: 120 MMHG | OXYGEN SATURATION: 99 % | WEIGHT: 205 LBS

## 2025-03-12 DIAGNOSIS — N39.43 POST-VOID DRIBBLING: ICD-10-CM

## 2025-03-12 DIAGNOSIS — N40.1 BENIGN PROSTATIC HYPERPLASIA WITH URINARY HESITANCY: ICD-10-CM

## 2025-03-12 DIAGNOSIS — R39.11 BENIGN PROSTATIC HYPERPLASIA WITH URINARY HESITANCY: ICD-10-CM

## 2025-03-12 DIAGNOSIS — Z12.5 PROSTATE CANCER SCREENING: Primary | ICD-10-CM

## 2025-03-12 RX ORDER — TAMSULOSIN HYDROCHLORIDE 0.4 MG/1
0.8 CAPSULE ORAL
Qty: 180 CAPSULE | Refills: 3 | Status: SHIPPED | OUTPATIENT
Start: 2025-03-12 | End: 2026-03-12

## 2025-03-12 NOTE — PROGRESS NOTES
Nabil Andino is a(n) 64 y.o. male. , :  1960    Subjective     Assessment:  The primary encounter diagnosis was Prostate cancer screening. Diagnoses of Benign prostatic hyperplasia with urinary hesitancy and Post-void dribbling were also pertinent to this visit.  BPH  BPH on tamsulosin 0.8mg for improvement in stream  Some continued post void dribbling   ISMAEL 25 with benign feeling exam.         Plan:  PSA now and prior to follow-up in 1 year  Continue the tamsulosin 0.8mg daily   Perineal pressure after voiding to help with post void dribbling        Radiology      Labs   2019 PSA 0.4  2020 PSA 0.3      Past Medical History  Hypertension  Diabetes  Gout  Back surgery  Right bicep tendon repair    Past  History  BPH with obstruction.  Hesitancy and decreased stream. Slightly better on flomax daily.  Change to twice daily 3/2023.     Past  surgical history       Prior Visits  2024 OV [facility]  63-year-old gentleman with slow urination.  Agreeable to trial of alpha-blocker.  Side effect discussed.  Best to take after a meal.  Plan: Flomax trial and follow-up in 4 to 6 weeks.        3/11/2024 OV [facility]  63-year-old gentleman with slow urination.  Started alpha-blocker 2024.  Here to discuss results. Still getting up and still dribbling a little bit.  No dizziness.  Eats breakfast so will try changing to the AM.  Was taking the pills 5:30 to 6:30.  Able to fight the need to urinate in the middle of the night.  Plan: Continue the flomax.  Maybe try it in the morning.  May benefit from twice a day dosing and he should let me know if that works better so we can adjust the script.    3/12/2025 OV Heriberto Tello  64-year-old diabetic gentleman with obstructive BPH on tamsulosin 0.8 mg daily.  Currently presents for follow-up ISMAEL.  Pt notes a decent urinary stream, no dysuria, will have occ urinary urgency with dribbling, and post void dribbling.  Nocturia 2-3 for smaller  "amounts.      Review of Systems    Lab Results   Component Value Date    PSA 0.3 08/25/2020    PSA 0.4 03/11/2019     No results found for: \"TESTOSTERONE\"  No components found for: \"CR\"  No results found for: \"HBA1C\"    Objective     /70 (BP Location: Left arm, Patient Position: Sitting, Cuff Size: Standard)   Pulse 83   Ht 5' 8\" (1.727 m)   Wt 93 kg (205 lb)   SpO2 99%   BMI 31.17 kg/m²     Physical Exam  Cardiovascular:      Rate and Rhythm: Normal rate.   Pulmonary:      Effort: Pulmonary effort is normal.   Genitourinary:     Comments: Prostate  Grade II  Soft, NT  No nodules   Skin:     General: Skin is warm.   Neurological:      Mental Status: He is alert.   Psychiatric:         Mood and Affect: Mood normal.           Heriberto Tello, [facility] [place]  "

## 2025-04-24 ENCOUNTER — OFFICE VISIT (OUTPATIENT)
Age: 65
End: 2025-04-24

## 2025-04-24 VITALS
OXYGEN SATURATION: 97 % | DIASTOLIC BLOOD PRESSURE: 83 MMHG | HEIGHT: 68 IN | HEART RATE: 78 BPM | BODY MASS INDEX: 31.37 KG/M2 | SYSTOLIC BLOOD PRESSURE: 136 MMHG | WEIGHT: 207 LBS | TEMPERATURE: 98.6 F

## 2025-04-24 DIAGNOSIS — Z00.00 ANNUAL PHYSICAL EXAM: Primary | ICD-10-CM

## 2025-04-24 DIAGNOSIS — G47.33 OBSTRUCTIVE SLEEP APNEA SYNDROME: ICD-10-CM

## 2025-04-24 DIAGNOSIS — E66.9 OBESITY (BMI 30-39.9): ICD-10-CM

## 2025-04-24 DIAGNOSIS — E11.65 TYPE 2 DIABETES MELLITUS WITH HYPERGLYCEMIA, WITHOUT LONG-TERM CURRENT USE OF INSULIN (HCC): ICD-10-CM

## 2025-04-24 DIAGNOSIS — Z11.59 NEED FOR HEPATITIS C SCREENING TEST: ICD-10-CM

## 2025-04-24 DIAGNOSIS — I10 ESSENTIAL HYPERTENSION: ICD-10-CM

## 2025-04-24 PROCEDURE — 99214 OFFICE O/P EST MOD 30 MIN: CPT | Performed by: FAMILY MEDICINE

## 2025-04-24 PROCEDURE — G0402 INITIAL PREVENTIVE EXAM: HCPCS | Performed by: FAMILY MEDICINE

## 2025-04-24 NOTE — PATIENT INSTRUCTIONS
"Patient Education     Routine physical for adults   The Basics   Written by the doctors and editors at [facility]   What is a physical? -- A physical is a routine visit, or \"check-up,\" with your doctor. You might also hear it called a \"wellness visit\" or \"preventive visit.\"  During each visit, the doctor will:   Ask about your physical and mental health   Ask about your habits, behaviors, and lifestyle   Do an exam   Give you vaccines if needed   Talk to you about any medicines you take   Give advice about your health   Answer your questions  Getting regular check-ups is an important part of taking care of your health. It can help your doctor find and treat any problems you have. But it's also important for preventing health problems.  A routine physical is different from a \"sick visit.\" A sick visit is when you see a doctor because of a health concern or problem. Since physicals are scheduled ahead of time, you can think about what you want to ask the doctor.  How often should I get a physical? -- It depends on your age and health. In general, for people age 21 years and older:   If you are younger than 50 years, you might be able to get a physical every 3 years.   If you are 50 years or older, your doctor might recommend a physical every year.  If you have an ongoing health condition, like diabetes or high blood pressure, your doctor will probably want to see you more often.  What happens during a physical? -- In general, each visit will include:   Physical exam - The doctor or nurse will check your height, weight, heart rate, and blood pressure. They will also look at your eyes and ears. They will ask about how you are feeling and whether you have any symptoms that bother you.   Medicines - It's a good idea to bring a list of all the medicines you take to each doctor visit. Your doctor will talk to you about your medicines and answer any questions. Tell them if you are having any side effects that bother you. You " "should also tell them if you are having trouble paying for any of your medicines.   Habits and behaviors - This includes:   Your diet   Your exercise habits   Whether you smoke, drink alcohol, or use drugs   Whether you are sexually active   Whether you feel safe at home  Your doctor will talk to you about things you can do to improve your health and lower your risk of health problems. They will also offer help and support. For example, if you want to quit smoking, they can give you advice and might prescribe medicines. If you want to improve your diet or get more physical activity, they can help you with this, too.   Lab tests, if needed - The tests you get will depend on your age and situation. For example, your doctor might want to check your:   Cholesterol   Blood sugar   Iron level   Vaccines - The recommended vaccines will depend on your age, health, and what vaccines you already had. Vaccines are very important because they can prevent certain serious or deadly infections.   Discussion of screening - \"Screening\" means checking for diseases or other health problems before they cause symptoms. Your doctor can recommend screening based on your age, risk, and preferences. This might include tests to check for:   Cancer, such as breast, prostate, cervical, ovarian, colorectal, prostate, lung, or skin cancer   Sexually transmitted infections, such as chlamydia and gonorrhea   Mental health conditions like depression and anxiety  Your doctor will talk to you about the different types of screening tests. They can help you decide which screenings to have. They can also explain what the results might mean.   Answering questions - The physical is a good time to ask the doctor or nurse questions about your health. If needed, they can refer you to other doctors or specialists, too.  Adults older than 65 years often need other care, too. As you get older, your doctor will talk to you about:   How to prevent falling at " home   Hearing or vision tests   Memory testing   How to take your medicines safely   Making sure that you have the help and support you need at home  All topics are updated as new evidence becomes available and our peer review process is complete.  This topic retrieved from [employer] on: May 02, 2024.  Topic 584321 Version 1.0  Release: 32.4.3 - C32.122  © 2024 UpToDate, Inc. and/or its affiliates. All rights reserved.  Consumer Information Use and Disclaimer   Disclaimer: This generalized information is a limited summary of diagnosis, treatment, and/or medication information. It is not meant to be comprehensive and should be used as a tool to help the user understand and/or assess potential diagnostic and treatment options. It does NOT include all information about conditions, treatments, medications, side effects, or risks that may apply to a specific patient. It is not intended to be medical advice or a substitute for the medical advice, diagnosis, or treatment of a health care provider based on the health care provider's examination and assessment of a patient's specific and unique circumstances. Patients must speak with a health care provider for complete information about their health, medical questions, and treatment options, including any risks or benefits regarding use of medications. This information does not endorse any treatments or medications as safe, effective, or approved for treating a specific patient. UpToDate, Inc. and its affiliates disclaim any warranty or liability relating to this information or the use thereof.The use of this information is governed by the Terms of Use, available at https://www.woltersShanghai Nouriz Dairyuwer.com/en/know/clinical-effectiveness-terms. 2024© UpToDate, Inc. and its affiliates and/or licensors. All rights reserved.  Copyright   © 2024 UpToDate, Inc. and/or its affiliates. All rights reserved.

## 2025-04-24 NOTE — ASSESSMENT & PLAN NOTE
/83 today. Continue lisinopril 10 mg daily   Pt reports this BP is high for him but he was stuck in traffic getting here and frustrated  f/u 2 months

## 2025-04-24 NOTE — PROGRESS NOTES
Adult Annual Physical  Name: Nabil Andino      : 1960      MRN: [id number]  Encounter Provider: Jesika Giron MD  Encounter Date: 2025   Encounter department: [facility]    :  Assessment & Plan  Annual physical exam         Type 2 diabetes mellitus with hyperglycemia, without long-term current use of insulin (HCC)    Lab Results   Component Value Date    HGBA1C 6.5 2025     Well controlled  Continue metformin 1000 mg daily, ACEi and statin   Continue lifestyle management. Has had recent weight gain  Labwork ordered   UTD on foot and eye exam   F/u 2 months    Orders:    Albumin / creatinine urine ratio; Future    Hemoglobin A1C; Future    Lipid panel; Future    Comprehensive metabolic panel; Future    Obstructive sleep apnea syndrome  Diagnosed CARLOS pt reports many years ago. Had surgery and unable to tolerate CPAP machine. Reports he cannot have anything on his face and rolls around a lot in his sleep. Pt is interested in inspire therapy   Pt is willing to see sleep medicine again and discuss options. Did discuss may need repeat sleep evaluation. Pt is slightly frustrated but amenable     Orders:    Ambulatory Referral to Sleep Medicine; Future    Essential hypertension  /83 today. Continue lisinopril 10 mg daily   Pt reports this BP is high for him but he was stuck in traffic getting here and frustrated  f/u 2 months          Obesity (BMI 30-39.9)    BMI 31.47. Hx of CARLOS   Lifestyle management  Basic labwork ordered     Orders:    Albumin / creatinine urine ratio; Future    Hemoglobin A1C; Future    Lipid panel; Future    Comprehensive metabolic panel; Future    Need for hepatitis C screening test    Orders:    Hepatitis C antibody; Future        Preventive Screenings:  - Diabetes Screening: screening not indicated and has diabetes  - Cholesterol Screening: screening not indicated and has hyperlipidemia   - Hepatitis C screening: risks/benefits  discussed, patient agrees to screening and orders placed   - HIV screening: screening up-to-date   - Colon cancer screening: screening up-to-date   - Lung cancer screening: screening not indicated   - AAA screening: screening not indicated     Counseling/Anticipatory Guidance:    - Diet: discussed recommendations for a healthy/well-balanced diet.   - Exercise: the importance of regular exercise/physical activity was discussed. Recommend exercise 3-5 times per week for at least 30 minutes.     BMI Counseling: Body mass index is 31.47 kg/m². The BMI is above normal. Nutrition recommendations include decreasing portion sizes, encouraging healthy choices of fruits and vegetables, decreasing fast food intake and limiting drinks that contain sugar. Exercise recommendations include moderate physical activity 150 minutes/week, exercising 3-5 times per week and strength training exercises. Rationale for BMI follow-up plan is due to patient being overweight or obese.     Depression Screening and Follow-up Plan: Patient was screened for depression during today's encounter. They screened negative with a PHQ-2 score of 1.        History of Present Illness     Adult Annual Physical:  Patient presents for annual physical. 65 yo male presenting for annual physical    Frustrated about mychart. Could not find scheduled appt on phone or portal. Know about it because got email. Did online thing. But unable to get labs in time.   Has sleep apnea and cannot use the cpap machine. Sleeps about 2 hrs at a time. Wants something he saw on tv. Wants routine  blood work done.    Sleep apnea test study years ago. Had surgery for it. Getting really bad. Cannot wear mask. Rolls around too much. Don't want something on face, doesn't think he would be able to use nasal cannula either. Says not going to use mask. 20 years ago. Thing on TV, something on arm. Says he is not eligible because said he needs to use mask first.  .     Diet and Physical  "Activity:  - Diet/Nutrition: well balanced diet, consuming 3-5 servings of fruits/vegetables daily and limited junk food.  - Exercise: no formal exercise. not active at all, frustrated with lack of work    Depression Screening:  - PHQ-2 Score: 1    General Health:  - Sleep: sleeps poorly, 1-3 hours of sleep on average, snores loudly, unrefreshing sleep, witnessed apnea and witnessed gasping.  - Hearing: decreased hearing left ear, decreased hearing bilateral ears, tinnitus and requires use of hearing aids.  - Vision:. Read when i cant read the text  - Dental: regular dental visits, brushes teeth twice daily and floss regularly.    /GYN Health:  - Follows with GYN: no.   - History of STDs: no     Health:  - History of STDs: no.   - Urinary symptoms: incomplete bladder emptying, nocturia and post-void dribbling.     Advanced Care Planning:  - Has an advanced directive?: no    - Has a durable medical POA?: no    - ACP document given to patient?: yes      Review of Systems   Constitutional:  Negative for chills and fever.   HENT:  Negative for ear pain and sore throat.    Eyes:  Negative for pain and visual disturbance.   Respiratory:  Negative for cough and shortness of breath.    Cardiovascular:  Negative for chest pain and palpitations.   Gastrointestinal:  Negative for abdominal pain and vomiting.   Genitourinary:  Negative for dysuria and hematuria.   Musculoskeletal:  Negative for arthralgias and back pain.   Skin:  Negative for color change and rash.   Neurological:  Negative for seizures and syncope.   Psychiatric/Behavioral:  Positive for sleep disturbance.    All other systems reviewed and are negative.        Objective   /83 (BP Location: Left arm, Patient Position: Sitting, Cuff Size: Standard)   Pulse 78   Temp 98.6 °F (37 °C) (Tympanic)   Ht 5' 8\" (1.727 m)   Wt 93.9 kg (207 lb)   SpO2 97%   BMI 31.47 kg/m²     Physical Exam  Vitals reviewed.   Constitutional:       General: He is not in " acute distress.     Appearance: Normal appearance. He is well-developed. He is obese.   HENT:      Head: Normocephalic and atraumatic.      Right Ear: External ear normal.      Left Ear: External ear normal.      Nose: Nose normal.   Eyes:      Extraocular Movements: Extraocular movements intact.      Conjunctiva/sclera: Conjunctivae normal.   Cardiovascular:      Rate and Rhythm: Normal rate and regular rhythm.      Pulses: Normal pulses.      Heart sounds: Normal heart sounds. No murmur heard.  Pulmonary:      Effort: Pulmonary effort is normal. No respiratory distress.      Breath sounds: Normal breath sounds. No wheezing or rales.   Musculoskeletal:         General: No swelling.      Cervical back: Neck supple.   Skin:     General: Skin is warm and dry.      Capillary Refill: Capillary refill takes less than 2 seconds.      Findings: No rash.   Neurological:      General: No focal deficit present.      Mental Status: He is alert. Mental status is at baseline.           Jesika Giron MD  [facility]  Date: 4/25/2025 Time: 10:33 AM

## 2025-04-24 NOTE — ASSESSMENT & PLAN NOTE
Lab Results   Component Value Date    HGBA1C 6.5 01/23/2025     Well controlled  Continue metformin 1000 mg daily, ACEi and statin   Continue lifestyle management. Has had recent weight gain  Labwork ordered   UTD on foot and eye exam   F/u 2 months    Orders:    Albumin / creatinine urine ratio; Future    Hemoglobin A1C; Future    Lipid panel; Future    Comprehensive metabolic panel; Future

## 2025-04-25 PROBLEM — G47.33 OBSTRUCTIVE SLEEP APNEA SYNDROME: Status: ACTIVE | Noted: 2025-04-25

## 2025-04-25 NOTE — ASSESSMENT & PLAN NOTE
Diagnosed CARLOS pt reports many years ago. Had surgery and unable to tolerate CPAP machine. Reports he cannot have anything on his face and rolls around a lot in his sleep. Pt is interested in inspire therapy   Pt is willing to see sleep medicine again and discuss options. Did discuss may need repeat sleep evaluation. Pt is slightly frustrated but amenable     Orders:    Ambulatory Referral to Sleep Medicine; Future

## 2025-05-12 DIAGNOSIS — I10 ESSENTIAL HYPERTENSION: ICD-10-CM

## 2025-05-12 NOTE — TELEPHONE ENCOUNTER
Vm left on rx line:    LULA Green as in [place] 1960 I left an e-mail on my charts last week for my lisinopril refill, my metamorphin refill, and my AT Exostatin refill. I'm out of lisinopril in three days. The other stuff I have a week or so worth of medicine left so but if I can get these refilled it'd be great. Greatly appreciated Thank you [phone].

## 2025-05-13 RX ORDER — LISINOPRIL 10 MG/1
10 TABLET ORAL DAILY
Qty: 90 TABLET | Refills: 3 | Status: SHIPPED | OUTPATIENT
Start: 2025-05-13 | End: 2026-05-08

## 2025-05-27 DIAGNOSIS — I10 ESSENTIAL HYPERTENSION: ICD-10-CM

## 2025-05-27 RX ORDER — ATORVASTATIN CALCIUM 20 MG/1
20 TABLET, FILM COATED ORAL DAILY
Qty: 90 TABLET | Refills: 0 | Status: SHIPPED | OUTPATIENT
Start: 2025-05-27

## 2025-05-28 DIAGNOSIS — E11.65 TYPE 2 DIABETES MELLITUS WITH HYPERGLYCEMIA, WITHOUT LONG-TERM CURRENT USE OF INSULIN (HCC): ICD-10-CM

## 2025-05-28 NOTE — TELEPHONE ENCOUNTER
AMBROSIO left on rx line:    Morteza with the pH Ara ZAPATA 1960 I've left messages on my chart. I've left messages here before I received things back that my scripts have been that have been sent in to be renewed by [facility]. I'm out as of Saturday. I called up [facility] once again. They don't have any record of it being called in. Could somebody please take care of this? [facility] [place], [place] and whatever else. Actual [facility] I don't know. I can't even say the name of the other one, but I'm out of [facility] as of Saturday. I would have thought this would have been redone. Thank you.      **Please see note**

## 2025-06-10 ENCOUNTER — OFFICE VISIT (OUTPATIENT)
Dept: OTOLARYNGOLOGY | Facility: CLINIC | Age: 65
End: 2025-06-10
Payer: COMMERCIAL

## 2025-06-10 VITALS — WEIGHT: 200 LBS | BODY MASS INDEX: 30.31 KG/M2 | HEIGHT: 68 IN | TEMPERATURE: 97.5 F

## 2025-06-10 DIAGNOSIS — H61.23 BILATERAL IMPACTED CERUMEN: ICD-10-CM

## 2025-06-10 DIAGNOSIS — R06.83 SNORING: ICD-10-CM

## 2025-06-10 DIAGNOSIS — I10 ESSENTIAL HYPERTENSION: ICD-10-CM

## 2025-06-10 DIAGNOSIS — H90.3 SENSORINEURAL HEARING LOSS (SNHL), BILATERAL: Primary | ICD-10-CM

## 2025-06-10 DIAGNOSIS — H93.13 BILATERAL TINNITUS: ICD-10-CM

## 2025-06-10 DIAGNOSIS — G47.33 OBSTRUCTIVE SLEEP APNEA: ICD-10-CM

## 2025-06-10 PROCEDURE — 69210 REMOVE IMPACTED EAR WAX UNI: CPT | Performed by: NURSE PRACTITIONER

## 2025-06-10 PROCEDURE — 99214 OFFICE O/P EST MOD 30 MIN: CPT | Performed by: NURSE PRACTITIONER

## 2025-06-10 RX ORDER — GLYBURIDE 5 MG/1
TABLET ORAL EVERY 24 HOURS
COMMUNITY

## 2025-06-10 RX ORDER — ATORVASTATIN CALCIUM 20 MG/1
20 TABLET, FILM COATED ORAL DAILY
Qty: 90 TABLET | Refills: 0 | Status: SHIPPED | OUTPATIENT
Start: 2025-06-10

## 2025-06-10 NOTE — PATIENT INSTRUCTIONS
Tinnitus and possible causes including medications, viral illness, inner ear disorder, TMJ syndrome, or hearing changes.   Options for bilateral tinnitus including adding background noise, tinnitus retraining therapy, masking device, Resound tinnitus yandel, Acupuncture, or acceptance.  Limit caffeine and ibuprofen intake. Discouraged q-tip use due to damage of ear canal.  Consider Flonase daily for eustachian tube dysfunction.  No specific medications or surgery indicated for treatment of tinnitus.     Wax care at home - avoidance of q-tips, may use cerumen softeners every one to two months.  Hydrocortisone cream pea sized amount on finger as needed for itching in ears.     [facility] - Dr Ashwin Johnson - [phone]

## 2025-06-10 NOTE — TELEPHONE ENCOUNTER
left on RX line:    Lucius BRANNON as in [place] 1960 I called I've done this through my chart. I've called this in a while ago for for my metamorphism and for the actual satin statin. I need to add astrostatin. I'm out again. I stay once again. [facility] says they don't have it on record. Could somebody please call it in to [facility] for a refill? Thank you [facility] [place] [place].

## 2025-06-10 NOTE — PROGRESS NOTES
:  Assessment & Plan  Sensorineural hearing loss (SNHL), bilateral         Bilateral tinnitus         Bilateral impacted cerumen    Orders:    Ear cerumen removal    Obstructive sleep apnea         Snoring           Cerumen  On exam noted bilateral cerumen impaction and unable to fully view tympanic membrane.  Cerumen impaction removed bilateral eac with #5 suction, pt tolerated procedure well.  Upon removal, improved hearing and decreased clogged sensation of bilateral ears.  Discussed routine cerumen care including avoidance of q-tips, may use cerumen softeners every one to two months.  Hydrocortisone cream pea sized amount on finger as needed for itching in ears.  Encourage ongoing follow up annually to monitor for cerumen and hearing.       Bilateral SNHL  Audiogram completed 04/2024 reviewed and interpreted and indicated right SNHL moderate to severe, left mixed hearing loss - conductive in low frequency with asymmetry then meeting with right ear in higher frequencies for SNHL.   Word discrimination 100% bilaterally. Tymps Type A bilaterally      Last visit, Reviewed causes of asymmetrical SNHL in gradual nature including noise exposure, hereditary, autoimmune, otosclerosis, Lyme, viral, inflammatory process, vs acoustic neuroma.  Reviewed treatment options including lab studies, and imaging Ct temporal bones vs MRI brain with IAC. After discussion, pt declined imaging at this time.      Typical causes of SNHL include maturity changes, noise exposure, and hereditary risk factors.   Offered options for bilateral SNHL including acceptance, lifestyle changes such as moving closer to those who are speaking, or hearing amplification.  He would qualify for hearing amplification based on audiogram.  Discussed hearing aid options including facilities to obtain a hearing aid from as well as costs and benefits.  Discussed that quality of life may improve with hearing amplification and he agreed to continue with current  hearing aids.    Tinnitus  Tinnitus and possible causes including medications, viral illness, inner ear disorder, TMJ syndrome, or hearing changes.   Options for bilateral tinnitus including adding background noise, tinnitus retraining therapy, masking device, Resound tinnitus yandel, Acupuncture, or acceptance.  Limit caffeine and ibuprofen intake. Discouraged q-tip use due to damage of ear canal.  Consider Flonase daily for eustachian tube dysfunction.  No specific medications or surgery indicated for treatment of tinnitus.  Consider MRI imaging if worsens     Obstructive sleep apnea  Reviewed with pt his concerns about poor sleep. He states he tosses and turns all night. Prior sleep study 20 years ago. Pt stated he is concerned he would not be able to tolerate a CPAP, wearing anything on his face. Pending sleep medicine evaluation. Discussed weight management related to snoring. Recommend updated sleep study to determine if he still has sleep concerns. Also reviewed he may or may not have to trial CPAP in order to move to next step of Inspire implanted device. Pt given information on how to contact our [place] office for Dr Johnson who performs Inspire procedure.     Greater than 50% of visit spent on counseling, review of prior testing and care, shared decision making, and coordination of care      History of Present Illness     Nabil Andino is a 64 y.o. male   Presents today for follow up due to ear concerns.  Hearing gradually worsening.  Left ear he has had gradual decrease in hearing over time. Bilateral ears feel blocked.  Ringing tinnitus since childhood.  No otalgia or otorrhea.  No history of ear surgery.  Current hearing aids, about 4 years old.    Since last visit, no significant changes. More recent concerns include difficulty sleeping.   History of many concussions over time. He shoots guns recreationally. He states that he does wear earplugs and that he is a right-handed gun shooter. 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 Ear infections as child, approx one per year.         Review of Systems   Constitutional: Negative.    HENT:  Positive for hearing loss and tinnitus. Negative for congestion, ear discharge, ear pain, nosebleeds, postnasal drip, rhinorrhea, sinus pressure, sinus pain, sore throat and voice change.    Respiratory:  Negative for chest tightness and shortness of breath.    Skin:  Negative for color change.   Neurological:  Negative for dizziness, numbness and headaches.   Psychiatric/Behavioral: Negative.       Objective   Temp 97.5 °F (36.4 °C) (Temporal)   Ht 5' 8\" (1.727 m)   Wt 90.7 kg (200 lb)   BMI 30.41 kg/m²      Physical Exam  Vitals and nursing note reviewed.   Constitutional:       General: He is not in acute distress.     Appearance: He is well-developed.   HENT:      Head: Normocephalic and atraumatic.      Right Ear: Tympanic membrane, ear canal and external ear normal. There is impacted cerumen.      Left Ear: Tympanic membrane, ear canal and external ear normal. There is impacted cerumen.      Nose: Septal deviation present.      Mouth/Throat:      Mouth: Mucous membranes are moist.   Pulmonary:      Effort: Pulmonary effort is normal.     Musculoskeletal:      Cervical back: Neck supple.     Skin:     General: Skin is warm and dry.     Neurological:      Mental Status: He is alert.     Psychiatric:         Mood and Affect: Mood normal.       Ear cerumen removal    Date/Time: 6/10/2025 4:00 PM    Performed by: LEW Davis  Authorized by: LEW Davis    Universal Protocol:  procedure performed by consultantConsent: Verbal consent obtained  Risks and benefits: risks, benefits and alternatives were discussed  Consent given by: patient  Patient understanding: patient states understanding of the procedure being performed    Patient location:  Clinic  Procedure details:     Local " anesthetic:  None    Location:  L ear and R ear    Approach:  External  Post-procedure details:     Complication:  None    Hearing quality:  Normal    Patient tolerance of procedure:  Tolerated well, no immediate complications

## 2025-06-18 LAB
ALBUMIN/CREAT UR: <6 MG/G CREAT (ref 0–29)
CREAT UR-MCNC: 51.2 MG/DL
HCV AB S/CO SERPL IA: NON REACTIVE
MICROALBUMIN UR-MCNC: <3 UG/ML

## 2025-06-20 ENCOUNTER — RESULTS FOLLOW-UP (OUTPATIENT)
Age: 65
End: 2025-06-20

## 2025-06-24 NOTE — PATIENT INSTRUCTIONS
The Flomax takes about 3 to 4 weeks to work.  Lets meet again after that and see about either adding a different drug or increasing the dose or proceeding further.  A lot of it depends on your response to the medication.  
Detail Level: Detailed

## 2025-06-26 ENCOUNTER — OFFICE VISIT (OUTPATIENT)
Age: 65
End: 2025-06-26

## 2025-06-26 VITALS
TEMPERATURE: 98 F | OXYGEN SATURATION: 99 % | RESPIRATION RATE: 16 BRPM | HEART RATE: 85 BPM | HEIGHT: 68 IN | WEIGHT: 207 LBS | DIASTOLIC BLOOD PRESSURE: 79 MMHG | BODY MASS INDEX: 31.37 KG/M2 | SYSTOLIC BLOOD PRESSURE: 136 MMHG

## 2025-06-26 DIAGNOSIS — E78.2: ICD-10-CM

## 2025-06-26 DIAGNOSIS — R00.2 PALPITATIONS: ICD-10-CM

## 2025-06-26 DIAGNOSIS — E78.2 COMBINED HYPERLIPIDEMIA: ICD-10-CM

## 2025-06-26 DIAGNOSIS — E11.65 TYPE 2 DIABETES MELLITUS WITH HYPERGLYCEMIA, WITHOUT LONG-TERM CURRENT USE OF INSULIN (HCC): ICD-10-CM

## 2025-06-26 DIAGNOSIS — E11.69: ICD-10-CM

## 2025-06-26 DIAGNOSIS — I10 ESSENTIAL HYPERTENSION: Primary | ICD-10-CM

## 2025-06-26 PROCEDURE — 99214 OFFICE O/P EST MOD 30 MIN: CPT | Performed by: FAMILY MEDICINE

## 2025-06-26 PROCEDURE — G2211 COMPLEX E/M VISIT ADD ON: HCPCS | Performed by: FAMILY MEDICINE

## 2025-06-26 NOTE — ASSESSMENT & PLAN NOTE
Lab Results   Component Value Date    HGBA1C 6.5 01/23/2025     Well controlled  Continue metformin 1000 mg daily, ACEi and statin   Continue lifestyle management. Labwork reviewed presented by patient unclear why it is not in system   Pt reports he wants to lose weight   UTD on diabetic eye exam  Return for diabetic foot exam and HbA1c due in 1 month   F/u 1 month     Updated labwork:   CMP 6/17/25   Glucose 149 (pt reports fasting)   Cr 0.73 eGFR 101   BUN/Cr 16     Lipid panel   Total cholesterol 140  Triglycerides 141  HDL 42  LDL 73

## 2025-06-26 NOTE — ASSESSMENT & PLAN NOTE
Lab Results   Component Value Date    HGBA1C 6.5 01/23/2025     Updated labwork:   CMP 6/17/25   Glucose 149  Cr 0.73 eGFR 101   BUN/Cr 16     Lipid panel   Total cholesterol 140  Triglycerides 141  HDL 42  LDL 73

## 2025-06-26 NOTE — PATIENT INSTRUCTIONS
"Patient Education     Diabetes and diet   The Basics   Written by the doctors and editors at [facility]   Why is diet important if I have diabetes? -- Diet is important because it is part of diabetes treatment. Many people need to change what they eat and how much they eat to help treat their diabetes. It is important for people to treat their diabetes so they:   Keep their blood sugar at goal   Prevent long-term problems, such as heart or kidney problems, that can happen in people with diabetes  Changing your diet can also help treat obesity, high blood pressure, and high cholesterol. These conditions can affect people with diabetes and can lead to future problems, such as heart attacks or strokes.  Who will help me change my diet? -- Your doctor or nurse will work with you to make a food plan to change your diet. They might also recommend that you work with a dietitian. A dietitian is an expert on food and eating.  Do I need to eat at the same times every day? -- When and how often you should eat depends, in part, on the diabetes medicines you take. For example:   People who take about the same amount of insulin at the same time each day (called a \"fixed regimen\") should eat meals at the same times. This is also true for people who take pills that increase insulin levels, such as sulfonylureas. Eating meals at the same time every day helps prevent low blood sugar.   People who adjust the dose and timing of their insulin each day (called a \"flexible regimen\") do not always have to eat meals at the same time. That's because they can time their insulin dose for before they plan to eat, and also adjust the dose for how much they plan to eat.   People who take medicines that don't usually cause low blood sugar, such as metformin, don't have to eat meals at the same time every day.  What do I need to think about when planning what to eat? -- Our bodies break down the food we eat into small pieces called carbohydrates, " "proteins, and fats.  When planning what to eat, people with diabetes need to think about:   Carbohydrates (or \"carbs\") - These are sugars the body uses for energy. They can raise your blood sugar level. Your doctor, nurse, or dietitian will tell you how many carbs you should eat at each meal or snack. Foods that have carbs include:   Bread, pasta, and rice   Vegetables and fruits   Dairy foods   Foods and drinks with added sugar  It is best to get your carbs from fruits, vegetables, whole grains, and low-fat milk. It is best to avoid drinks with added sugar, like soda, juices, and sports drinks.   Protein - Your doctor, nurse, or dietitian will tell you how much protein you should eat each day. It is best to eat lean meats, fish, eggs, beans, peas, soy products, nuts, and seeds. Avoid or limit processed meats like craft, hot dogs, and sausages.   Fats - The type of fat you eat is more important than the amount of fat. \"Saturated\" and \"trans\" fats can increase your risk for heart problems, like a heart attack.   Foods that have saturated fats include meat, butter, cheese, and ice cream.   Foods that have trans fats include processed food with \"partially hydrogenated oils\" on the ingredient list. This might include fried foods, store-bought cookies, muffins, pies, and cakes.  \"Monounsaturated\" and \"polyunsaturated\" fats are better for you. Foods with these types of fat include fish, avocado, olive oil, and nuts.   Calories - You need to eat a certain amount of calories each day to keep your weight the same. If you have excess body weight and want to lose weight, you need to eat fewer calories each day.   Fiber - Eating foods with a lot of fiber can help manage your blood sugar level. Foods that have a lot of fiber include apples, green beans, peas, beans, lentils, nuts, oatmeal, and whole grains.   Salt - People who have high blood pressure should not eat foods that contain a lot of salt (also called sodium). People " with high blood pressure should also eat healthy foods, such as fruits, vegetables, and low-fat dairy foods.   Alcohol and sugary drinks - Having more than 1 alcoholic drink (for females) or 2 drinks (for males) a day can raise blood sugar levels. Also, sugary drinks like fruit juice or soda can raise blood sugar levels.  How can I lose weight? -- You can:   Exercise - Try to get at least 30 minutes of physical activity a day, most days of the week. Even gentle exercise, like walking, is good for your health. Some people with diabetes need to change their medicine dose before they exercise. They might also need to check their blood sugar levels before and after exercising.   Eat fewer calories - Your doctor, nurse, or dietitian can tell you how many calories you should eat each day to lose weight.  If you are worried about your weight, size, or shape, talk with your doctor, nurse, or dietitian. They can help you make changes to improve your health.  Can I eat the same foods as my family? -- Yes. You do not need to eat special foods if you have diabetes. You and your family can eat the same foods. Changing your diet is mostly about eating healthy foods in healthy amounts.  What are the other parts of diabetes treatment? -- Besides changing your diet, the other parts of diabetes treatment are:   Exercise   Medicines  Some people with diabetes need to learn how to match their diet and exercise with their medicine dose. For example, people who use insulin might need to choose the dose of insulin they give themselves. To choose their dose, they need to think about:   What they plan to eat at the next meal   How much exercise they plan to do   What their blood sugar level is  If the diet and exercise do not match the medicine dose, a person's blood sugar level can get too low or too high. Blood sugar levels that are too low or too high can cause problems.  All topics are updated as new evidence becomes available and our  peer review process is complete.  This topic retrieved from [employer] on: Mar 27, 2024.  Topic 17515 Version 13.0  Release: 32.2.4 - C32.85  © 2024 UpToDate, Inc. and/or its affiliates. All rights reserved.  Consumer Information Use and Disclaimer   Disclaimer: This generalized information is a limited summary of diagnosis, treatment, and/or medication information. It is not meant to be comprehensive and should be used as a tool to help the user understand and/or assess potential diagnostic and treatment options. It does NOT include all information about conditions, treatments, medications, side effects, or risks that may apply to a specific patient. It is not intended to be medical advice or a substitute for the medical advice, diagnosis, or treatment of a health care provider based on the health care provider's examination and assessment of a patient's specific and unique circumstances. Patients must speak with a health care provider for complete information about their health, medical questions, and treatment options, including any risks or benefits regarding use of medications. This information does not endorse any treatments or medications as safe, effective, or approved for treating a specific patient. UpToDate, Inc. and its affiliates disclaim any warranty or liability relating to this information or the use thereof.The use of this information is governed by the Terms of Use, available at https://www.woltersbetter.uwer.com/en/know/clinical-effectiveness-terms. 2024© UpToDate, Inc. and its affiliates and/or licensors. All rights reserved.  Copyright   © 2024 UpToDate, Inc. and/or its affiliates. All rights reserved.

## 2025-06-26 NOTE — ASSESSMENT & PLAN NOTE
Stable. Continue atorvastatin 20 mg daily  Pt does have concerns about cardiac risk due to family hx. Did discuss he is already on aspirin and atorvastatin  Pt would like to consider coronary calcium screening  Recent lipid panel well controlled  Consider increase in atorvastatin pending risk %

## 2025-06-26 NOTE — PROGRESS NOTES
Name: Nabil Andino      : 1960      MRN: [id number]  Encounter Provider: Jesika Giron MD  Encounter Date: 2025   Encounter department: [facility]  :  Assessment & Plan  Essential hypertension  /79 today. Continue lisinopril 10 mg daily            Type 2 diabetes mellitus with hyperglycemia, without long-term current use of insulin (HCC)    Lab Results   Component Value Date    HGBA1C 6.5 2025     Well controlled  Continue metformin 1000 mg daily, ACEi and statin   Continue lifestyle management. Labwork reviewed presented by patient unclear why it is not in system   Pt reports he wants to lose weight   UTD on diabetic eye exam  Return for diabetic foot exam and HbA1c due in 1 month   F/u 1 month     Updated labwork:   CMP 25   Glucose 149 (pt reports fasting)   Cr 0.73 eGFR 101   BUN/Cr 16     Lipid panel   Total cholesterol 140  Triglycerides 141  HDL 42  LDL 73         Combined hyperlipidemia associated with type 2 diabetes mellitus  (HCC)    Lab Results   Component Value Date    HGBA1C 6.5 2025     Updated labwork:   CMP 25   Glucose 149  Cr 0.73 eGFR 101   BUN/Cr 16     Lipid panel   Total cholesterol 140  Triglycerides 141  HDL 42  LDL 73       Combined hyperlipidemia  Stable. Continue atorvastatin 20 mg daily  Pt does have concerns about cardiac risk due to family hx. Did discuss he is already on aspirin and atorvastatin  Pt would like to consider coronary calcium screening  Recent lipid panel well controlled  Consider increase in atorvastatin pending risk %        Palpitations  Vitals and exam normal today   Pt reports occasional palpitations some evenings lasting 5-10 minutes and self resolving  Pt is concerned due to family hx and eastern  descent   Did discuss possibility of further work up including ambulatory holter monitoring or other cardiac imaging  Pt would like to hold off at this time until he  "returns  Continue to monitor  If persistent or worsening pursue in depth work up               History of Present Illness   HPI  65 yo male presenting for f/u BP, DM, review blood work.     Pt wants to lose weight. Eat healthier. Wants to lose to 170-180 lbs.   In the evenings pt has been getting heart palpitations for 5-10 minutes that go away. Not every night but sometimes can be couple nights in a row, then don't have in 2 weeks to a month.   Pt is really concerned about heart because family history and eastern  descent. Father's side uncle had triple bypass surgery and multiple heart conditions. So pt is worried.   Wants to think about coronary calcium screening. For when he gets back      Updated labwork:   CMP 6/17/25   Glucose 149  Cr 0.73 eGFR 101   BUN/Cr 16     Lipid panel   Total cholesterol 140  Triglycerides 141  HDL 42  LDL 73    Pt does have current stress and worried about 90 year old uncle in [place] who was recently hospitalized and had a fall. He is planning on travel to [place] to take care of some things for him. Wants to hold off on additional testing until he returns.     Review of Systems   Constitutional:  Negative for chills and fever.   HENT:  Negative for ear pain and sore throat.    Eyes:  Negative for pain and visual disturbance.   Respiratory:  Negative for cough and shortness of breath.    Cardiovascular:  Positive for palpitations. Negative for chest pain.   Gastrointestinal:  Negative for abdominal pain and vomiting.   Genitourinary:  Negative for dysuria and hematuria.   Musculoskeletal:  Negative for arthralgias and back pain.   Skin:  Negative for color change and rash.   Neurological:  Negative for seizures and syncope.   All other systems reviewed and are negative.      Objective   /79 (BP Location: Left arm, Patient Position: Sitting, Cuff Size: Adult)   Pulse 85   Temp 98 °F (36.7 °C) (Tympanic)   Resp 16   Ht 5' 8\" (1.727 m)   Wt 93.9 kg (207 lb)   SpO2 " 99%   BMI 31.47 kg/m²      Physical Exam  Vitals reviewed.   Constitutional:       General: He is not in acute distress.     Appearance: Normal appearance. He is well-developed. He is obese.   HENT:      Head: Normocephalic and atraumatic.      Right Ear: External ear normal.      Left Ear: External ear normal.      Nose: Nose normal.      Mouth/Throat:      Mouth: Mucous membranes are moist.      Pharynx: Oropharynx is clear.     Eyes:      Extraocular Movements: Extraocular movements intact.      Conjunctiva/sclera: Conjunctivae normal.       Cardiovascular:      Rate and Rhythm: Normal rate and regular rhythm.      Pulses: Normal pulses.      Heart sounds: Normal heart sounds. No murmur heard.  Pulmonary:      Effort: Pulmonary effort is normal. No respiratory distress.      Breath sounds: Normal breath sounds. No wheezing or rales.   Abdominal:      General: Bowel sounds are normal.      Palpations: Abdomen is soft.      Tenderness: There is no abdominal tenderness.     Musculoskeletal:         General: No swelling.      Cervical back: Neck supple.     Skin:     General: Skin is warm and dry.      Capillary Refill: Capillary refill takes less than 2 seconds.      Findings: No rash.     Neurological:      General: No focal deficit present.      Mental Status: He is alert. Mental status is at baseline.     Psychiatric:         Mood and Affect: Mood normal.         Behavior: Behavior normal.             Jesika Giron MD  [facility]  Date: 6/26/2025 Time: 4:20 PM

## 2025-07-24 ENCOUNTER — OFFICE VISIT (OUTPATIENT)
Age: 65
End: 2025-07-24
Attending: FAMILY MEDICINE
Payer: COMMERCIAL

## 2025-07-24 VITALS
OXYGEN SATURATION: 98 % | SYSTOLIC BLOOD PRESSURE: 130 MMHG | HEART RATE: 75 BPM | BODY MASS INDEX: 31.61 KG/M2 | DIASTOLIC BLOOD PRESSURE: 80 MMHG | HEIGHT: 68 IN | WEIGHT: 208.6 LBS

## 2025-07-24 DIAGNOSIS — G47.33 OBSTRUCTIVE SLEEP APNEA SYNDROME: ICD-10-CM

## 2025-07-24 DIAGNOSIS — F51.04 CHRONIC INSOMNIA: ICD-10-CM

## 2025-07-24 DIAGNOSIS — E66.09 CLASS 1 OBESITY DUE TO EXCESS CALORIES WITHOUT SERIOUS COMORBIDITY WITH BODY MASS INDEX (BMI) OF 31.0 TO 31.9 IN ADULT: Primary | ICD-10-CM

## 2025-07-24 DIAGNOSIS — E66.811 CLASS 1 OBESITY DUE TO EXCESS CALORIES WITHOUT SERIOUS COMORBIDITY WITH BODY MASS INDEX (BMI) OF 31.0 TO 31.9 IN ADULT: Primary | ICD-10-CM

## 2025-07-24 PROCEDURE — 99203 OFFICE O/P NEW LOW 30 MIN: CPT | Performed by: INTERNAL MEDICINE

## 2025-07-24 RX ORDER — TRAZODONE HYDROCHLORIDE 50 MG/1
50 TABLET ORAL
Qty: 30 TABLET | Refills: 2 | Status: SHIPPED | OUTPATIENT
Start: 2025-07-24 | End: 2025-10-22

## 2025-07-24 NOTE — ASSESSMENT & PLAN NOTE
Previously diagnosed 20 years ago  He has not tried CPAP as he was afraid he would not be able to tolerate it  Mallampati class 4, Body mass index is 31.72 kg/m².,  .    At risk for obstructive sleep apnea based on [place] survey based on snoring, tiredness, observed apneas, male gender, age, elevated blood pressure    I discussed in depth the diagnostic studies and treatment options involved with obstructive sleep apnea  I also discussed in depth the risk of leaving sleep apnea untreated including hypertension, heart failure, arrhythmia, MI and stroke.      Plan  Ordered home sleep study  Patient is amenable to CPAP  If he is unable to tolerate CPAP I will refer to ENT for inspire evaluation    Orders:    Ambulatory Referral to Sleep Medicine    Home Sleep Study; Future

## 2025-07-24 NOTE — PROGRESS NOTES
Name: Nabil Andino      : 1960      MRN: [id number]  Encounter Provider: Coy Moulton MD  Encounter Date: 2025   Encounter department: [facility] [place]    :  Assessment & Plan  Obstructive sleep apnea syndrome  Previously diagnosed 20 years ago  He has not tried CPAP as he was afraid he would not be able to tolerate it  Mallampati class 4, Body mass index is 31.72 kg/m².,  .    At risk for obstructive sleep apnea based on STOP BANG survey based on snoring, tiredness, observed apneas, male gender, age, elevated blood pressure    I discussed in depth the diagnostic studies and treatment options involved with obstructive sleep apnea  I also discussed in depth the risk of leaving sleep apnea untreated including hypertension, heart failure, arrhythmia, MI and stroke.      Plan  Ordered home sleep study  Patient is amenable to CPAP  If he is unable to tolerate CPAP I will refer to ENT for inspire evaluation    Orders:    Ambulatory Referral to Sleep Medicine    Home Sleep Study; Future    Class 1 obesity due to excess calories without serious comorbidity with body mass index (BMI) of 31.0 to 31.9 in adult  Counseled patient on lifestyle modifications including diet and exercise  Explained that weight loss will decrease the severity of sleep apnea         Chronic insomnia  Has racing thoughts  Counseled patient on sleep hygiene measures  Ordered trazodone to 50 mg    Orders:    traZODone (DESYREL) 50 mg tablet; Take 1 tablet (50 mg total) by mouth daily at bedtime      History of Present Illness     64-year-old male with past medical history of gout, type 2 diabetes, hyperlipidemia, hypertension, sensorineural hearing loss who presents for evaluation of obstructive sleep apnea.  He was diagnosed 20 years ago and states he had surgery on his uvula and palate.  He noticed an improvement in symptoms but afterwards there is a recurrence of symptoms.  He reports loud snoring and witnessed  apneas.  He goes to bed at 9 PM and it takes 45 minutes to fall asleep.  He wakes up 3-4 times at night to urinate.  He gets out of bed at 4 AM to go to work.  He gets less than 5 hours of sleep.  He does not feel refreshed when he wakes up.  He drinks coffee in the morning.  He has been told that he is gagging in his sleep.  He also had surgery on his nose but he does not remember what type of surgery.  His current [place] score is 7.  He would like to know more about treatment options for CARLOS including hypoglossal nerve stimulator implantation.                Sitting and reading: (Patient-Rptd) Moderate chance of dozing  Watching TV: (Patient-Rptd) Moderate chance of dozing  Sitting, inactive in a public place (e.g. a theatre or a meeting): (Patient-Rptd) Slight chance of dozing  As a passenger in a car for an hour without a break: (Patient-Rptd) Slight chance of dozing  Lying down to rest in the afternoon when circumstances permit: (Patient-Rptd) Would never doze  Sitting and talking to someone: (Patient-Rptd) Would never doze  Sitting quietly after a lunch without alcohol: (Patient-Rptd) Would never doze  In a car, while stopped for a few minutes in traffic: (Patient-Rptd) Slight chance of dozing  Total score: (Patient-Rptd) 7       Review of Systems   Constitutional: Negative.    HENT: Negative.     Eyes: Negative.    Respiratory: Negative.     Cardiovascular: Negative.    Gastrointestinal: Negative.    Endocrine: Negative.    Genitourinary: Negative.    Musculoskeletal: Negative.    Skin: Negative.    Allergic/Immunologic: Negative.    Neurological: Negative.    Hematological: Negative.    Psychiatric/Behavioral: Negative.       Pertinent positives/negatives included in HPI and also as noted:       Pertinent Medical History           Medical History Reviewed by provider this encounter:     .  Past Medical History   Past Medical History[1]  Past Surgical History[2]  Family History[3]   reports that he has never  "smoked. He has never used smokeless tobacco. He reports current alcohol use. He reports that he does not use drugs.  Current Outpatient Medications   Medication Instructions    aspirin (ECOTRIN LOW STRENGTH) 81 mg, Daily    atorvastatin (LIPITOR) 20 mg, Oral, Daily    cetirizine (ZYRTEC) 10 mg, Daily    fluticasone (FLONASE) 50 mcg/act nasal spray 2 sprays, Daily    glyBURIDE (DIABETA) 5 mg tablet Every 24 hours    lisinopril (ZESTRIL) 10 mg, Oral, Daily    metFORMIN (GLUCOPHAGE) 1,000 mg, Oral, Daily with breakfast    tamsulosin (FLOMAX) 0.8 mg, Oral, Daily with dinner   Allergies[4]   Medications Ordered Prior to Encounter[5]   Social History[6]  Objective   /80 (BP Location: Right arm, Patient Position: Sitting, Cuff Size: Standard)   Pulse 75   Ht 5' 8\" (1.727 m)   Wt 94.6 kg (208 lb 9.6 oz)   SpO2 98%   BMI 31.72 kg/m²        Physical Exam  Vitals reviewed.   HENT:      Head: Normocephalic and atraumatic.      Nose: Nose normal.      Mouth/Throat:      Mouth: Mucous membranes are moist.     Eyes:      Extraocular Movements: Extraocular movements intact.      Pupils: Pupils are equal, round, and reactive to light.       Cardiovascular:      Rate and Rhythm: Normal rate and regular rhythm.      Pulses: Normal pulses.   Pulmonary:      Effort: Pulmonary effort is normal.      Breath sounds: Normal breath sounds.   Abdominal:      General: Abdomen is flat. Bowel sounds are normal.      Palpations: Abdomen is soft.     Musculoskeletal:         General: Normal range of motion.      Cervical back: Normal range of motion.     Skin:     General: Skin is warm.     Neurological:      Mental Status: He is alert. Mental status is at baseline.     Psychiatric:         Mood and Affect: Mood normal.       Visit Vitals  /80 (BP Location: Right arm, Patient Position: Sitting, Cuff Size: Standard)   Pulse 75   Ht 5' 8\" (1.727 m)   Wt 94.6 kg (208 lb 9.6 oz)   SpO2 98%   BMI 31.72 kg/m²   Smoking Status Never " "  BSA 2.08 m²           Data  Lab Results   Component Value Date    HGB 13.9 05/28/2019    HCT 39.0 05/28/2019    MCV 91.1 05/28/2019      Lab Results   Component Value Date    GLUCOSE 112 (H) 12/09/2016    CALCIUM 9.9 06/24/2022     12/09/2016    K 4.4 06/24/2022    CO2 27 06/24/2022     06/24/2022    BUN 17 06/24/2022    CREATININE 0.96 06/24/2022     No results found for: \"IRON\", \"TIBC\", \"FERRITIN\"  Lab Results   Component Value Date    AST 16 09/30/2021    ALT 17 09/30/2021                  [1]   Past Medical History:  Diagnosis Date    Allergic     Arthritis     Back pain     Bladder problem     Diabetes mellitus (HCC) 8/21    Diverticulitis of colon     HL (hearing loss)     Hypertension    [2]   Past Surgical History:  Procedure Laterality Date    ANKLE FRACTURE SURGERY Right     orif    ANKLE HARDWARE REMOVAL Right     BACK SURGERY      laminectomy l 5    BICEPS TENDON REPAIR Right     FRACTURE SURGERY      SEPTOPLASTY      SPINE SURGERY      VASECTOMY  1998   [3]   Family History  Problem Relation Name Age of Onset    Stroke Mother arvin Belle     Hypertension Mother arvin Belle     Prostate cancer Maternal Uncle Chester cornell     Prostate cancer Maternal Uncle Chester cornell    [4]   Allergies  Allergen Reactions    Other      seasonal    Pollen Extract Other (See Comments)   [5]   Current Outpatient Medications on File Prior to Visit   Medication Sig Dispense Refill    aspirin (ECOTRIN LOW STRENGTH) 81 mg EC tablet Take 81 mg by mouth in the morning.      atorvastatin (LIPITOR) 20 mg tablet Take 1 tablet (20 mg total) by mouth daily 90 tablet 0    cetirizine (ZyrTEC) 10 mg tablet Take 10 mg by mouth in the morning.      fluticasone (FLONASE) 50 mcg/act nasal spray 2 sprays into each nostril in the morning.      lisinopril (ZESTRIL) 10 mg tablet Take 1 tablet (10 mg total) by mouth daily 90 tablet 3    metFORMIN (GLUCOPHAGE) 1000 MG tablet Take 1 tablet (1,000 mg total) " by mouth daily with breakfast 90 tablet 3    tamsulosin (FLOMAX) 0.4 mg Take 2 capsules (0.8 mg total) by mouth daily with dinner 180 capsule 3    glyBURIDE (DIABETA) 5 mg tablet every 24 hours (Patient not taking: Reported on 7/24/2025)       No current facility-administered medications on file prior to visit.   [6]   Social History  Tobacco Use    Smoking status: Never    Smokeless tobacco: Never   Vaping Use    Vaping status: Never Used   Substance and Sexual Activity    Alcohol use: Yes    Drug use: Never

## 2025-07-28 ENCOUNTER — OFFICE VISIT (OUTPATIENT)
Age: 65
End: 2025-07-28

## 2025-07-28 VITALS
HEIGHT: 68 IN | WEIGHT: 210 LBS | OXYGEN SATURATION: 97 % | TEMPERATURE: 98 F | DIASTOLIC BLOOD PRESSURE: 95 MMHG | RESPIRATION RATE: 16 BRPM | BODY MASS INDEX: 31.83 KG/M2 | HEART RATE: 78 BPM | SYSTOLIC BLOOD PRESSURE: 149 MMHG

## 2025-07-28 DIAGNOSIS — E11.69: ICD-10-CM

## 2025-07-28 DIAGNOSIS — I10 ESSENTIAL HYPERTENSION: ICD-10-CM

## 2025-07-28 DIAGNOSIS — E78.2 COMBINED HYPERLIPIDEMIA: ICD-10-CM

## 2025-07-28 DIAGNOSIS — E11.65 TYPE 2 DIABETES MELLITUS WITH HYPERGLYCEMIA, WITHOUT LONG-TERM CURRENT USE OF INSULIN (HCC): Primary | ICD-10-CM

## 2025-07-28 DIAGNOSIS — E78.2: ICD-10-CM

## 2025-07-28 DIAGNOSIS — Z13.6 SCREENING FOR CARDIOVASCULAR CONDITION: ICD-10-CM

## 2025-07-28 LAB — SL AMB POCT HEMOGLOBIN AIC: 6.4 (ref ?–6.5)

## 2025-07-28 PROCEDURE — 99214 OFFICE O/P EST MOD 30 MIN: CPT | Performed by: FAMILY MEDICINE

## 2025-07-28 PROCEDURE — 83036 HEMOGLOBIN GLYCOSYLATED A1C: CPT | Performed by: FAMILY MEDICINE

## 2025-08-04 ENCOUNTER — VBI (OUTPATIENT)
Dept: ADMINISTRATIVE | Facility: OTHER | Age: 65
End: 2025-08-04

## 2025-08-08 ENCOUNTER — HOSPITAL ENCOUNTER (OUTPATIENT)
Dept: CT IMAGING | Facility: CLINIC | Age: 65
Discharge: HOME | End: 2025-08-08
Attending: FAMILY MEDICINE
Payer: COMMERCIAL

## 2025-08-08 DIAGNOSIS — E78.2 COMBINED HYPERLIPIDEMIA: ICD-10-CM

## 2025-08-08 DIAGNOSIS — E78.2: ICD-10-CM

## 2025-08-08 DIAGNOSIS — E11.65 TYPE 2 DIABETES MELLITUS WITH HYPERGLYCEMIA, WITHOUT LONG-TERM CURRENT USE OF INSULIN (HCC): ICD-10-CM

## 2025-08-08 DIAGNOSIS — I10 ESSENTIAL HYPERTENSION: ICD-10-CM

## 2025-08-08 DIAGNOSIS — Z13.6 SCREENING FOR CARDIOVASCULAR CONDITION: ICD-10-CM

## 2025-08-08 DIAGNOSIS — E11.69: ICD-10-CM

## 2025-08-08 PROCEDURE — 75571 CT HRT W/O DYE W/CA TEST: CPT

## 2025-08-15 ENCOUNTER — TELEPHONE (OUTPATIENT)
Age: 65
End: 2025-08-15

## 2025-08-15 ENCOUNTER — HOSPITAL ENCOUNTER (OUTPATIENT)
Facility: CLINIC | Age: 65
Discharge: HOME | End: 2025-08-15
Attending: INTERNAL MEDICINE
Payer: MEDICARE

## 2025-08-15 DIAGNOSIS — G47.33 OBSTRUCTIVE SLEEP APNEA SYNDROME: ICD-10-CM

## 2025-08-15 PROCEDURE — G0399 HOME SLEEP TEST/TYPE 3 PORTA: HCPCS

## 2025-08-20 DIAGNOSIS — E78.2: Primary | ICD-10-CM

## 2025-08-20 DIAGNOSIS — R00.2 PALPITATIONS: ICD-10-CM

## 2025-08-20 DIAGNOSIS — R93.1 HIGH CORONARY ARTERY CALCIUM SCORE: ICD-10-CM

## 2025-08-20 DIAGNOSIS — I10 ESSENTIAL HYPERTENSION: ICD-10-CM

## 2025-08-20 DIAGNOSIS — E11.69: Primary | ICD-10-CM

## 2025-08-20 RX ORDER — ATORVASTATIN CALCIUM 40 MG/1
40 TABLET, FILM COATED ORAL DAILY
Qty: 90 TABLET | Refills: 3 | Status: SHIPPED | OUTPATIENT
Start: 2025-08-20

## 2025-08-22 PROCEDURE — 95806 SLEEP STUDY UNATT&RESP EFFT: CPT | Performed by: INTERNAL MEDICINE
